# Patient Record
Sex: MALE | Race: WHITE | NOT HISPANIC OR LATINO | Employment: OTHER | URBAN - METROPOLITAN AREA
[De-identification: names, ages, dates, MRNs, and addresses within clinical notes are randomized per-mention and may not be internally consistent; named-entity substitution may affect disease eponyms.]

---

## 2017-01-03 RX ORDER — AMLODIPINE BESYLATE 5 MG/1
TABLET ORAL
Qty: 90 TABLET | Refills: 0 | Status: SHIPPED | OUTPATIENT
Start: 2017-01-03 | End: 2017-03-17 | Stop reason: SDUPTHER

## 2017-01-03 RX ORDER — ATORVASTATIN CALCIUM 40 MG/1
TABLET, FILM COATED ORAL
Qty: 90 TABLET | Refills: 0 | Status: SHIPPED | OUTPATIENT
Start: 2017-01-03 | End: 2017-04-03 | Stop reason: SDUPTHER

## 2017-01-03 RX ORDER — LISINOPRIL 20 MG/1
TABLET ORAL
Qty: 90 TABLET | Refills: 0 | Status: SHIPPED | OUTPATIENT
Start: 2017-01-03 | End: 2017-04-03 | Stop reason: SDUPTHER

## 2017-03-17 RX ORDER — METOPROLOL TARTRATE 50 MG/1
TABLET, FILM COATED ORAL
Qty: 180 TABLET | Refills: 0 | Status: SHIPPED | OUTPATIENT
Start: 2017-03-17 | End: 2017-06-20 | Stop reason: SDUPTHER

## 2017-03-17 RX ORDER — AMLODIPINE BESYLATE 5 MG/1
TABLET ORAL
Qty: 90 TABLET | Refills: 0 | Status: SHIPPED | OUTPATIENT
Start: 2017-03-17 | End: 2017-07-10 | Stop reason: SDUPTHER

## 2017-04-03 RX ORDER — LISINOPRIL 20 MG/1
TABLET ORAL
Qty: 90 TABLET | Refills: 0 | Status: SHIPPED | OUTPATIENT
Start: 2017-04-03 | End: 2017-07-10 | Stop reason: SDUPTHER

## 2017-04-03 RX ORDER — ATORVASTATIN CALCIUM 40 MG/1
TABLET, FILM COATED ORAL
Qty: 90 TABLET | Refills: 0 | Status: SHIPPED | OUTPATIENT
Start: 2017-04-03 | End: 2017-07-10 | Stop reason: SDUPTHER

## 2017-05-19 RX ORDER — CLOPIDOGREL BISULFATE 75 MG/1
TABLET ORAL
Qty: 90 TABLET | Refills: 0 | Status: SHIPPED | OUTPATIENT
Start: 2017-05-19 | End: 2017-08-31 | Stop reason: SDUPTHER

## 2017-06-20 RX ORDER — METOPROLOL TARTRATE 50 MG/1
TABLET, FILM COATED ORAL
Qty: 180 TABLET | Refills: 0 | Status: SHIPPED | OUTPATIENT
Start: 2017-06-20 | End: 2017-09-29 | Stop reason: SDUPTHER

## 2017-06-20 RX ORDER — ISOSORBIDE MONONITRATE 30 MG/1
TABLET, EXTENDED RELEASE ORAL
Qty: 90 TABLET | Refills: 1 | Status: SHIPPED | OUTPATIENT
Start: 2017-06-20 | End: 2018-01-15 | Stop reason: SDUPTHER

## 2017-07-10 RX ORDER — ATORVASTATIN CALCIUM 40 MG/1
TABLET, FILM COATED ORAL
Qty: 90 TABLET | Refills: 0 | Status: SHIPPED | OUTPATIENT
Start: 2017-07-10 | End: 2017-07-11 | Stop reason: SDUPTHER

## 2017-07-10 RX ORDER — AMLODIPINE BESYLATE 5 MG/1
TABLET ORAL
Qty: 90 TABLET | Refills: 0 | Status: SHIPPED | OUTPATIENT
Start: 2017-07-10 | End: 2017-07-11 | Stop reason: SDUPTHER

## 2017-07-10 RX ORDER — LISINOPRIL 20 MG/1
TABLET ORAL
Qty: 90 TABLET | Refills: 0 | Status: SHIPPED | OUTPATIENT
Start: 2017-07-10 | End: 2017-07-11 | Stop reason: SDUPTHER

## 2017-07-11 RX ORDER — AMLODIPINE BESYLATE 5 MG/1
5 TABLET ORAL DAILY
Qty: 90 TABLET | Refills: 1 | Status: SHIPPED | OUTPATIENT
Start: 2017-07-11 | End: 2019-02-20 | Stop reason: SDUPTHER

## 2017-07-11 RX ORDER — ATORVASTATIN CALCIUM 40 MG/1
40 TABLET, FILM COATED ORAL DAILY
Qty: 90 TABLET | Refills: 1 | Status: SHIPPED | OUTPATIENT
Start: 2017-07-11 | End: 2018-04-20 | Stop reason: SDUPTHER

## 2017-07-11 RX ORDER — LISINOPRIL 20 MG/1
20 TABLET ORAL DAILY
Qty: 90 TABLET | Refills: 1 | Status: SHIPPED | OUTPATIENT
Start: 2017-07-11 | End: 2018-04-20 | Stop reason: SDUPTHER

## 2017-08-31 RX ORDER — CLOPIDOGREL BISULFATE 75 MG/1
TABLET ORAL
Qty: 90 TABLET | Refills: 0 | Status: SHIPPED | OUTPATIENT
Start: 2017-08-31 | End: 2018-01-04 | Stop reason: SDUPTHER

## 2017-09-29 RX ORDER — METOPROLOL TARTRATE 50 MG/1
TABLET, FILM COATED ORAL
Qty: 180 TABLET | Refills: 0 | Status: SHIPPED | OUTPATIENT
Start: 2017-09-29 | End: 2018-01-04 | Stop reason: SDUPTHER

## 2018-01-05 RX ORDER — METOPROLOL TARTRATE 50 MG/1
TABLET, FILM COATED ORAL
Qty: 180 TABLET | Refills: 0 | Status: SHIPPED | OUTPATIENT
Start: 2018-01-05 | End: 2018-04-06 | Stop reason: SDUPTHER

## 2018-01-05 RX ORDER — CLOPIDOGREL BISULFATE 75 MG/1
TABLET ORAL
Qty: 90 TABLET | Refills: 0 | Status: SHIPPED | OUTPATIENT
Start: 2018-01-05 | End: 2018-04-06 | Stop reason: SDUPTHER

## 2018-01-15 RX ORDER — ISOSORBIDE MONONITRATE 30 MG/1
TABLET, EXTENDED RELEASE ORAL
Qty: 90 TABLET | Refills: 0 | Status: SHIPPED | OUTPATIENT
Start: 2018-01-15 | End: 2018-04-20 | Stop reason: SDUPTHER

## 2018-04-06 RX ORDER — CLOPIDOGREL BISULFATE 75 MG/1
TABLET ORAL
Qty: 90 TABLET | Refills: 0 | Status: SHIPPED | OUTPATIENT
Start: 2018-04-06 | End: 2018-07-10 | Stop reason: SDUPTHER

## 2018-04-06 RX ORDER — METOPROLOL TARTRATE 50 MG/1
TABLET, FILM COATED ORAL
Qty: 180 TABLET | Refills: 0 | Status: SHIPPED | OUTPATIENT
Start: 2018-04-06 | End: 2018-07-18 | Stop reason: SDUPTHER

## 2018-04-19 RX ORDER — LISINOPRIL 20 MG/1
TABLET ORAL
Qty: 90 TABLET | Refills: 0 | OUTPATIENT
Start: 2018-04-19

## 2018-04-19 RX ORDER — ISOSORBIDE MONONITRATE 30 MG/1
TABLET, EXTENDED RELEASE ORAL
Qty: 90 TABLET | Refills: 0 | OUTPATIENT
Start: 2018-04-19

## 2018-04-19 RX ORDER — ATORVASTATIN CALCIUM 40 MG/1
TABLET, FILM COATED ORAL
Qty: 90 TABLET | Refills: 0 | OUTPATIENT
Start: 2018-04-19

## 2018-04-20 RX ORDER — ISOSORBIDE MONONITRATE 30 MG/1
30 TABLET, EXTENDED RELEASE ORAL DAILY
Qty: 90 TABLET | Refills: 0 | Status: SHIPPED | OUTPATIENT
Start: 2018-04-20 | End: 2018-07-18 | Stop reason: SDUPTHER

## 2018-04-20 RX ORDER — LISINOPRIL 20 MG/1
20 TABLET ORAL DAILY
Qty: 90 TABLET | Refills: 1 | Status: SHIPPED | OUTPATIENT
Start: 2018-04-20 | End: 2019-02-20

## 2018-04-20 RX ORDER — ATORVASTATIN CALCIUM 40 MG/1
40 TABLET, FILM COATED ORAL DAILY
Qty: 90 TABLET | Refills: 1 | Status: SHIPPED | OUTPATIENT
Start: 2018-04-20 | End: 2018-11-26 | Stop reason: SDUPTHER

## 2018-06-20 ENCOUNTER — OFFICE VISIT (OUTPATIENT)
Dept: INTERNAL MEDICINE | Facility: CLINIC | Age: 63
End: 2018-06-20

## 2018-06-20 VITALS
HEART RATE: 70 BPM | RESPIRATION RATE: 16 BRPM | HEIGHT: 72 IN | WEIGHT: 182 LBS | DIASTOLIC BLOOD PRESSURE: 81 MMHG | SYSTOLIC BLOOD PRESSURE: 160 MMHG | TEMPERATURE: 98.4 F | OXYGEN SATURATION: 97 % | BODY MASS INDEX: 24.65 KG/M2

## 2018-06-20 DIAGNOSIS — R09.89 BRUIT OF RIGHT CAROTID ARTERY: ICD-10-CM

## 2018-06-20 DIAGNOSIS — I10 ESSENTIAL HYPERTENSION: ICD-10-CM

## 2018-06-20 DIAGNOSIS — Z00.00 HEALTH CARE MAINTENANCE: Primary | ICD-10-CM

## 2018-06-20 DIAGNOSIS — E78.00 HYPERCHOLESTEROLEMIA: ICD-10-CM

## 2018-06-20 DIAGNOSIS — R01.1 HEART MURMUR: ICD-10-CM

## 2018-06-20 DIAGNOSIS — I25.10 ATHEROSCLEROSIS OF NATIVE CORONARY ARTERY OF NATIVE HEART WITHOUT ANGINA PECTORIS: ICD-10-CM

## 2018-06-20 PROCEDURE — 99214 OFFICE O/P EST MOD 30 MIN: CPT | Performed by: INTERNAL MEDICINE

## 2018-06-20 PROCEDURE — 99396 PREV VISIT EST AGE 40-64: CPT | Performed by: INTERNAL MEDICINE

## 2018-06-29 ENCOUNTER — TELEPHONE (OUTPATIENT)
Dept: INTERNAL MEDICINE | Facility: CLINIC | Age: 63
End: 2018-06-29

## 2018-06-29 DIAGNOSIS — E87.5 HIGH POTASSIUM: Primary | ICD-10-CM

## 2018-06-29 LAB
ALBUMIN SERPL-MCNC: 4.6 G/DL (ref 3.5–5.2)
ALBUMIN/GLOB SERPL: 2.1 G/DL
ALP SERPL-CCNC: 97 U/L (ref 39–117)
ALT SERPL-CCNC: 15 U/L (ref 1–41)
APPEARANCE UR: CLEAR
AST SERPL-CCNC: 16 U/L (ref 1–40)
BACTERIA #/AREA URNS HPF: NORMAL /HPF
BASOPHILS # BLD AUTO: 0.04 10*3/MM3 (ref 0–0.2)
BASOPHILS NFR BLD AUTO: 0.6 % (ref 0–1.5)
BILIRUB SERPL-MCNC: 0.3 MG/DL (ref 0.1–1.2)
BILIRUB UR QL STRIP: NEGATIVE
BUN SERPL-MCNC: 21 MG/DL (ref 8–23)
BUN/CREAT SERPL: 20.4 (ref 7–25)
CALCIUM SERPL-MCNC: 9.7 MG/DL (ref 8.6–10.5)
CASTS URNS MICRO: NORMAL
CHLORIDE SERPL-SCNC: 102 MMOL/L (ref 98–107)
CHOLEST SERPL-MCNC: 167 MG/DL (ref 0–200)
CO2 SERPL-SCNC: 25.2 MMOL/L (ref 22–29)
COLOR UR: YELLOW
CREAT SERPL-MCNC: 1.03 MG/DL (ref 0.76–1.27)
EOSINOPHIL # BLD AUTO: 0.25 10*3/MM3 (ref 0–0.7)
EOSINOPHIL NFR BLD AUTO: 4 % (ref 0.3–6.2)
EPI CELLS #/AREA URNS HPF: NORMAL /HPF
ERYTHROCYTE [DISTWIDTH] IN BLOOD BY AUTOMATED COUNT: 13 % (ref 11.5–14.5)
GLOBULIN SER CALC-MCNC: 2.2 GM/DL
GLUCOSE SERPL-MCNC: 112 MG/DL (ref 65–99)
GLUCOSE UR QL: NEGATIVE
HCT VFR BLD AUTO: 43.2 % (ref 40.4–52.2)
HDLC SERPL-MCNC: 38 MG/DL (ref 40–60)
HGB BLD-MCNC: 13.9 G/DL (ref 13.7–17.6)
HGB UR QL STRIP: NEGATIVE
IMM GRANULOCYTES # BLD: 0.01 10*3/MM3 (ref 0–0.03)
IMM GRANULOCYTES NFR BLD: 0.2 % (ref 0–0.5)
KETONES UR QL STRIP: NEGATIVE
LDLC SERPL CALC-MCNC: 101 MG/DL (ref 0–100)
LDLC/HDLC SERPL: 2.66 {RATIO}
LEUKOCYTE ESTERASE UR QL STRIP: NEGATIVE
LYMPHOCYTES # BLD AUTO: 1.51 10*3/MM3 (ref 0.9–4.8)
LYMPHOCYTES NFR BLD AUTO: 24 % (ref 19.6–45.3)
MCH RBC QN AUTO: 31.1 PG (ref 27–32.7)
MCHC RBC AUTO-ENTMCNC: 32.2 G/DL (ref 32.6–36.4)
MCV RBC AUTO: 96.6 FL (ref 79.8–96.2)
MONOCYTES # BLD AUTO: 0.96 10*3/MM3 (ref 0.2–1.2)
MONOCYTES NFR BLD AUTO: 15.2 % (ref 5–12)
NEUTROPHILS # BLD AUTO: 3.54 10*3/MM3 (ref 1.9–8.1)
NEUTROPHILS NFR BLD AUTO: 56.2 % (ref 42.7–76)
NITRITE UR QL STRIP: NEGATIVE
PH UR STRIP: 5.5 [PH] (ref 5–8)
PLATELET # BLD AUTO: 241 10*3/MM3 (ref 140–500)
POTASSIUM SERPL-SCNC: 6.1 MMOL/L (ref 3.5–5.2)
PROT SERPL-MCNC: 6.8 G/DL (ref 6–8.5)
PROT UR QL STRIP: NEGATIVE
PSA SERPL-MCNC: 0.5 NG/ML (ref 0–4)
RBC # BLD AUTO: 4.47 10*6/MM3 (ref 4.6–6)
RBC #/AREA URNS HPF: NORMAL /HPF
SODIUM SERPL-SCNC: 139 MMOL/L (ref 136–145)
SP GR UR: 1.02 (ref 1–1.03)
T4 FREE SERPL-MCNC: 0.96 NG/DL (ref 0.93–1.7)
TRIGL SERPL-MCNC: 140 MG/DL (ref 0–150)
TSH SERPL DL<=0.005 MIU/L-ACNC: 2.13 MIU/ML (ref 0.27–4.2)
UROBILINOGEN UR STRIP-MCNC: (no result) MG/DL
VLDLC SERPL CALC-MCNC: 28 MG/DL (ref 5–40)
WBC # BLD AUTO: 6.3 10*3/MM3 (ref 4.5–10.7)
WBC #/AREA URNS HPF: NORMAL /HPF

## 2018-06-30 NOTE — PROGRESS NOTES
Subjective   Blaine Jimenez is a 63 y.o. male.   He is here today for complete physical exam and also follow-up for hypertension hyperlipidemia native CAD and heart murmur as well  History of Present Illness   He is here today for complete physical exam and also follow-up for hypertension which is well-controlled current medication and hyperlipidemia which stable on current medication and native CAD which is stable on current medication with no evidence of chest pain or anginal equivalents and heart murmur discovered today as well  The following portions of the patient's history were reviewed and updated as appropriate: allergies, current medications, past family history, past medical history, past social history, past surgical history and problem list.    Review of Systems   All other systems reviewed and are negative.      Objective   Physical Exam   Constitutional: He is oriented to person, place, and time. Vital signs are normal. He appears well-developed and well-nourished. He is active.   HENT:   Head: Normocephalic and atraumatic.   Right Ear: Hearing, tympanic membrane, external ear and ear canal normal.   Left Ear: Hearing, tympanic membrane, external ear and ear canal normal.   Nose: Nose normal.   Mouth/Throat: Uvula is midline, oropharynx is clear and moist and mucous membranes are normal.   Eyes: Conjunctivae, EOM and lids are normal. Pupils are equal, round, and reactive to light. Right eye exhibits no discharge. Left eye exhibits no discharge.   Neck: Trachea normal, normal range of motion, full passive range of motion without pain and phonation normal. Neck supple. Carotid bruit is present (ight side). No edema present. No thyroid mass and no thyromegaly present.   Cardiovascular: Normal rate, regular rhythm, intact distal pulses and normal pulses.  Exam reveals no gallop and no friction rub.    Murmur (3/6 left lower sternal border) heard.  Pulmonary/Chest: Effort normal and breath sounds normal.  No respiratory distress. He has no wheezes. He has no rales.   Abdominal: Soft. Normal appearance, normal aorta and bowel sounds are normal. He exhibits no distension, no abdominal bruit and no mass. There is no hepatosplenomegaly. There is no tenderness. There is no rebound, no guarding and no CVA tenderness. No hernia. Hernia confirmed negative in the right inguinal area and confirmed negative in the left inguinal area.   Musculoskeletal: Normal range of motion. He exhibits no edema or tenderness.     Vascular Status -  His right foot exhibits normal foot vasculature  and no edema. His left foot exhibits normal foot vasculature  and no edema.  Skin Integrity  -  His right foot skin is intact.His left foot skin is intact..  Lymphadenopathy:     He has no cervical adenopathy.     He has no axillary adenopathy.        Right: No inguinal and no supraclavicular adenopathy present.        Left: No inguinal and no supraclavicular adenopathy present.   Neurological: He is alert and oriented to person, place, and time. He has normal strength. No cranial nerve deficit or sensory deficit. He exhibits normal muscle tone. He displays a negative Romberg sign. Coordination normal.   Skin: Skin is warm, dry and intact. No cyanosis. Nails show no clubbing.   Psychiatric: He has a normal mood and affect. His speech is normal and behavior is normal. Judgment and thought content normal. Cognition and memory are normal.   Nursing note and vitals reviewed.      Assessment/Plan   Diagnoses and all orders for this visit:    Health care maintenance  -     Lipid Panel With LDL / HDL Ratio  -     CBC & Differential  -     Comprehensive Metabolic Panel  -     T4, Free  -     TSH  -     Urinalysis With Microscopic - Urine, Clean Catch  -     PSA DIAGNOSTIC  -     Ambulatory Referral For Screening Colonoscopy    Essential hypertension    Hypercholesterolemia    Atherosclerosis of native coronary artery of native heart without angina  pectoris    Heart murmur  -     Adult Transthoracic Echo Complete W/ Cont if Necessary Per Protocol; Future    Bruit of right carotid artery  -     Duplex Carotid Ultrasound CAR; Future    Other orders  -     Microscopic Examination      Healthcare maintenance fasting labs vaccines colonoscopies on a regular basis  Hypertension well-controlled on current medication with no changes needed at this time  Hyper lipidemia stable on current medication with no changes needed and we will check lipid panel today  Native CAD no evidence of chest pain or anginal equivalence continue aspirin and beta blockers and Plavix as well  Heart murmur noted today and we'll get 2-D echo of heart for further workup  Bruit of right carotid artery we will check duplex Dopplers as well

## 2018-07-02 ENCOUNTER — LAB (OUTPATIENT)
Dept: LAB | Facility: HOSPITAL | Age: 63
End: 2018-07-02

## 2018-07-02 DIAGNOSIS — E87.5 HIGH POTASSIUM: ICD-10-CM

## 2018-07-02 LAB
ANION GAP SERPL CALCULATED.3IONS-SCNC: 12.4 MMOL/L
BUN BLD-MCNC: 26 MG/DL (ref 8–23)
BUN/CREAT SERPL: 17.1 (ref 7–25)
CALCIUM SPEC-SCNC: 9.3 MG/DL (ref 8.6–10.5)
CHLORIDE SERPL-SCNC: 104 MMOL/L (ref 98–107)
CO2 SERPL-SCNC: 24.6 MMOL/L (ref 22–29)
CREAT BLD-MCNC: 1.52 MG/DL (ref 0.76–1.27)
GFR SERPL CREATININE-BSD FRML MDRD: 46 ML/MIN/1.73
GLUCOSE BLD-MCNC: 95 MG/DL (ref 65–99)
MAGNESIUM SERPL-MCNC: 2.5 MG/DL (ref 1.6–2.4)
POTASSIUM BLD-SCNC: 5.6 MMOL/L (ref 3.5–5.2)
SODIUM BLD-SCNC: 141 MMOL/L (ref 136–145)

## 2018-07-02 PROCEDURE — 80048 BASIC METABOLIC PNL TOTAL CA: CPT | Performed by: INTERNAL MEDICINE

## 2018-07-02 PROCEDURE — 83735 ASSAY OF MAGNESIUM: CPT

## 2018-07-04 ENCOUNTER — RESULTS ENCOUNTER (OUTPATIENT)
Dept: INTERNAL MEDICINE | Facility: CLINIC | Age: 63
End: 2018-07-04

## 2018-07-04 DIAGNOSIS — E87.5 HIGH POTASSIUM: ICD-10-CM

## 2018-07-05 ENCOUNTER — HOSPITAL ENCOUNTER (OUTPATIENT)
Dept: CARDIOLOGY | Facility: HOSPITAL | Age: 63
Discharge: HOME | End: 2018-07-05

## 2018-07-05 ENCOUNTER — HOSPITAL ENCOUNTER (OUTPATIENT)
Dept: CARDIOLOGY | Facility: HOSPITAL | Age: 63
Discharge: HOME | End: 2018-07-05
Admitting: INTERNAL MEDICINE

## 2018-07-05 VITALS
HEART RATE: 62 BPM | WEIGHT: 182 LBS | BODY MASS INDEX: 24.65 KG/M2 | SYSTOLIC BLOOD PRESSURE: 188 MMHG | HEIGHT: 72 IN | DIASTOLIC BLOOD PRESSURE: 90 MMHG

## 2018-07-05 DIAGNOSIS — R01.1 HEART MURMUR: ICD-10-CM

## 2018-07-05 DIAGNOSIS — R09.89 BRUIT OF RIGHT CAROTID ARTERY: ICD-10-CM

## 2018-07-05 LAB
ASCENDING AORTA: 2.8 CM
BH CV ECHO MEAS - ACS: 1.6 CM
BH CV ECHO MEAS - AO MAX PG (FULL): 7.6 MMHG
BH CV ECHO MEAS - AO MAX PG: 10.4 MMHG
BH CV ECHO MEAS - AO MEAN PG (FULL): 4.3 MMHG
BH CV ECHO MEAS - AO MEAN PG: 6 MMHG
BH CV ECHO MEAS - AO ROOT AREA (BSA CORRECTED): 1.6
BH CV ECHO MEAS - AO ROOT AREA: 8.6 CM^2
BH CV ECHO MEAS - AO ROOT DIAM: 3.3 CM
BH CV ECHO MEAS - AO V2 MAX: 161 CM/SEC
BH CV ECHO MEAS - AO V2 MEAN: 115.8 CM/SEC
BH CV ECHO MEAS - AO V2 VTI: 43.6 CM
BH CV ECHO MEAS - AVA(I,A): 1.6 CM^2
BH CV ECHO MEAS - AVA(I,D): 1.6 CM^2
BH CV ECHO MEAS - AVA(V,A): 1.6 CM^2
BH CV ECHO MEAS - AVA(V,D): 1.6 CM^2
BH CV ECHO MEAS - BSA(HAYCOCK): 2.1 M^2
BH CV ECHO MEAS - BSA: 2 M^2
BH CV ECHO MEAS - BZI_BMI: 24.7 KILOGRAMS/M^2
BH CV ECHO MEAS - BZI_METRIC_HEIGHT: 182.9 CM
BH CV ECHO MEAS - BZI_METRIC_WEIGHT: 82.6 KG
BH CV ECHO MEAS - CONTRAST EF (2CH): 55.9 ML/M^2
BH CV ECHO MEAS - CONTRAST EF 4CH: 55.1 ML/M^2
BH CV ECHO MEAS - EDV(MOD-SP2): 136 ML
BH CV ECHO MEAS - EDV(MOD-SP4): 138 ML
BH CV ECHO MEAS - EDV(TEICH): 220.2 ML
BH CV ECHO MEAS - EF(CUBED): 56.7 %
BH CV ECHO MEAS - EF(MOD-BP): 56 %
BH CV ECHO MEAS - EF(MOD-SP2): 55.9 %
BH CV ECHO MEAS - EF(MOD-SP4): 55.1 %
BH CV ECHO MEAS - EF(TEICH): 47.3 %
BH CV ECHO MEAS - ESV(MOD-SP2): 60 ML
BH CV ECHO MEAS - ESV(MOD-SP4): 62 ML
BH CV ECHO MEAS - ESV(TEICH): 115.9 ML
BH CV ECHO MEAS - FS: 24.4 %
BH CV ECHO MEAS - IVS/LVPW: 0.92
BH CV ECHO MEAS - IVSD: 0.8 CM
BH CV ECHO MEAS - LAT PEAK E' VEL: 10 CM/SEC
BH CV ECHO MEAS - LV DIASTOLIC VOL/BSA (35-75): 67.4 ML/M^2
BH CV ECHO MEAS - LV MASS(C)D: 228.1 GRAMS
BH CV ECHO MEAS - LV MASS(C)DI: 111.5 GRAMS/M^2
BH CV ECHO MEAS - LV MAX PG: 2.8 MMHG
BH CV ECHO MEAS - LV MEAN PG: 1.7 MMHG
BH CV ECHO MEAS - LV SYSTOLIC VOL/BSA (12-30): 30.3 ML/M^2
BH CV ECHO MEAS - LV V1 MAX: 83.4 CM/SEC
BH CV ECHO MEAS - LV V1 MEAN: 62 CM/SEC
BH CV ECHO MEAS - LV V1 VTI: 22.7 CM
BH CV ECHO MEAS - LVIDD: 6.6 CM
BH CV ECHO MEAS - LVIDS: 5 CM
BH CV ECHO MEAS - LVLD AP2: 8.6 CM
BH CV ECHO MEAS - LVLD AP4: 8.6 CM
BH CV ECHO MEAS - LVLS AP2: 7.3 CM
BH CV ECHO MEAS - LVLS AP4: 7.8 CM
BH CV ECHO MEAS - LVOT AREA (M): 3.1 CM^2
BH CV ECHO MEAS - LVOT AREA: 3.1 CM^2
BH CV ECHO MEAS - LVOT DIAM: 2 CM
BH CV ECHO MEAS - LVPWD: 0.87 CM
BH CV ECHO MEAS - MED PEAK E' VEL: 8 CM/SEC
BH CV ECHO MEAS - MR MAX PG: 101.5 MMHG
BH CV ECHO MEAS - MR MAX VEL: 503.7 CM/SEC
BH CV ECHO MEAS - MV A DUR: 0.11 SEC
BH CV ECHO MEAS - MV A MAX VEL: 90.7 CM/SEC
BH CV ECHO MEAS - MV DEC SLOPE: 854.5 CM/SEC^2
BH CV ECHO MEAS - MV DEC TIME: 0.13 SEC
BH CV ECHO MEAS - MV E MAX VEL: 107.7 CM/SEC
BH CV ECHO MEAS - MV E/A: 1.2
BH CV ECHO MEAS - MV MAX PG: 7.7 MMHG
BH CV ECHO MEAS - MV MEAN PG: 3.1 MMHG
BH CV ECHO MEAS - MV P1/2T MAX VEL: 105.7 CM/SEC
BH CV ECHO MEAS - MV P1/2T: 36.2 MSEC
BH CV ECHO MEAS - MV V2 MAX: 138.5 CM/SEC
BH CV ECHO MEAS - MV V2 MEAN: 81.8 CM/SEC
BH CV ECHO MEAS - MV V2 VTI: 39.7 CM
BH CV ECHO MEAS - MVA P1/2T LCG: 2.1 CM^2
BH CV ECHO MEAS - MVA(P1/2T): 6.1 CM^2
BH CV ECHO MEAS - MVA(VTI): 1.8 CM^2
BH CV ECHO MEAS - PA ACC TIME: 0.14 SEC
BH CV ECHO MEAS - PA MAX PG (FULL): 5.9 MMHG
BH CV ECHO MEAS - PA MAX PG: 7.7 MMHG
BH CV ECHO MEAS - PA PR(ACCEL): 14 MMHG
BH CV ECHO MEAS - PA V2 MAX: 138.7 CM/SEC
BH CV ECHO MEAS - PULM A REVS DUR: 0.12 SEC
BH CV ECHO MEAS - PULM A REVS VEL: 29.2 CM/SEC
BH CV ECHO MEAS - PULM DIAS VEL: 86.5 CM/SEC
BH CV ECHO MEAS - PULM S/D: 0.79
BH CV ECHO MEAS - PULM SYS VEL: 68.7 CM/SEC
BH CV ECHO MEAS - RAP SYSTOLE: 3 MMHG
BH CV ECHO MEAS - RV MAX PG: 1.8 MMHG
BH CV ECHO MEAS - RV MEAN PG: 0.99 MMHG
BH CV ECHO MEAS - RV V1 MAX: 66.9 CM/SEC
BH CV ECHO MEAS - RV V1 MEAN: 45.5 CM/SEC
BH CV ECHO MEAS - RV V1 VTI: 14.1 CM
BH CV ECHO MEAS - RVSP: 43 MMHG
BH CV ECHO MEAS - SI(AO): 183 ML/M^2
BH CV ECHO MEAS - SI(CUBED): 78.1 ML/M^2
BH CV ECHO MEAS - SI(LVOT): 34.8 ML/M^2
BH CV ECHO MEAS - SI(MOD-SP2): 37.1 ML/M^2
BH CV ECHO MEAS - SI(MOD-SP4): 37.1 ML/M^2
BH CV ECHO MEAS - SI(TEICH): 50.9 ML/M^2
BH CV ECHO MEAS - SUP REN AO DIAM: 2.3 CM
BH CV ECHO MEAS - SV(AO): 374.6 ML
BH CV ECHO MEAS - SV(CUBED): 159.8 ML
BH CV ECHO MEAS - SV(LVOT): 71.3 ML
BH CV ECHO MEAS - SV(MOD-SP2): 76 ML
BH CV ECHO MEAS - SV(MOD-SP4): 76 ML
BH CV ECHO MEAS - SV(TEICH): 104.2 ML
BH CV ECHO MEAS - TAPSE (>1.6): 2.9 CM2
BH CV ECHO MEAS - TR MAX VEL: 316.3 CM/SEC
BH CV ECHO MEASUREMENTS AVERAGE E/E' RATIO: 11.97
BH CV XLRA - RV BASE: 3.2 CM
BH CV XLRA - TDI S': 14 CM/SEC
BH CV XLRA MEAS LEFT DIST CCA EDV: 9.4 CM/SEC
BH CV XLRA MEAS LEFT DIST CCA PSV: 70.4 CM/SEC
BH CV XLRA MEAS LEFT DIST ICA EDV: 0 CM/SEC
BH CV XLRA MEAS LEFT DIST ICA PSV: 0 CM/SEC
BH CV XLRA MEAS LEFT MID ICA EDV: 0 CM/SEC
BH CV XLRA MEAS LEFT MID ICA PSV: 0 CM/SEC
BH CV XLRA MEAS LEFT PROX CCA PSV: 63.3 CM/SEC
BH CV XLRA MEAS LEFT PROX ECA EDV: -5.9 CM/SEC
BH CV XLRA MEAS LEFT PROX ECA PSV: -100 CM/SEC
BH CV XLRA MEAS LEFT PROX ICA EDV: 0 CM/SEC
BH CV XLRA MEAS LEFT PROX ICA PSV: 0 CM/SEC
BH CV XLRA MEAS LEFT PROX SCLA PSV: 141 CM/SEC
BH CV XLRA MEAS LEFT VERTEBRAL A EDV: 6.5 CM/SEC
BH CV XLRA MEAS LEFT VERTEBRAL A PSV: 28.1 CM/SEC
BH CV XLRA MEAS RIGHT DIST CCA EDV: 21.7 CM/SEC
BH CV XLRA MEAS RIGHT DIST CCA PSV: 84.4 CM/SEC
BH CV XLRA MEAS RIGHT DIST ICA EDV: -29.9 CM/SEC
BH CV XLRA MEAS RIGHT DIST ICA PSV: -82.1 CM/SEC
BH CV XLRA MEAS RIGHT MID ICA EDV: -36.9 CM/SEC
BH CV XLRA MEAS RIGHT MID ICA PSV: -97.3 CM/SEC
BH CV XLRA MEAS RIGHT PROX CCA EDV: 17 CM/SEC
BH CV XLRA MEAS RIGHT PROX CCA PSV: 82.7 CM/SEC
BH CV XLRA MEAS RIGHT PROX ECA PSV: -90.9 CM/SEC
BH CV XLRA MEAS RIGHT PROX ICA EDV: -44 CM/SEC
BH CV XLRA MEAS RIGHT PROX ICA PSV: -134 CM/SEC
BH CV XLRA MEAS RIGHT PROX SCLA PSV: 112 CM/SEC
BH CV XLRA MEAS RIGHT VERTEBRAL A EDV: -24 CM/SEC
BH CV XLRA MEAS RIGHT VERTEBRAL A PSV: -79.2 CM/SEC
LEFT ARM BP: NORMAL MMHG
LEFT ATRIUM VOLUME INDEX: 28 ML/M2
MAXIMAL PREDICTED HEART RATE: 156 BPM
RIGHT ARM BP: NORMAL MMHG
SINUS: 2.9 CM
STJ: 1.8 CM
STRESS TARGET HR: 133 BPM

## 2018-07-05 PROCEDURE — 93306 TTE W/DOPPLER COMPLETE: CPT

## 2018-07-05 PROCEDURE — 93306 TTE W/DOPPLER COMPLETE: CPT | Performed by: INTERNAL MEDICINE

## 2018-07-05 PROCEDURE — 93880 EXTRACRANIAL BILAT STUDY: CPT

## 2018-07-10 DIAGNOSIS — R93.1 ABNORMAL ECHOCARDIOGRAM: Primary | ICD-10-CM

## 2018-07-10 DIAGNOSIS — N28.9 RENAL INSUFFICIENCY: Primary | ICD-10-CM

## 2018-07-10 DIAGNOSIS — R94.39 ABNORMAL CAROTID DUPLEX SCAN: ICD-10-CM

## 2018-07-10 RX ORDER — AMLODIPINE BESYLATE 5 MG/1
TABLET ORAL
Qty: 90 TABLET | Refills: 0 | Status: SHIPPED | OUTPATIENT
Start: 2018-07-10 | End: 2018-10-22 | Stop reason: SDUPTHER

## 2018-07-10 RX ORDER — CLOPIDOGREL BISULFATE 75 MG/1
TABLET ORAL
Qty: 90 TABLET | Refills: 0 | Status: SHIPPED | OUTPATIENT
Start: 2018-07-10 | End: 2018-10-22 | Stop reason: SDUPTHER

## 2018-07-15 ENCOUNTER — RESULTS ENCOUNTER (OUTPATIENT)
Dept: INTERNAL MEDICINE | Facility: CLINIC | Age: 63
End: 2018-07-15

## 2018-07-15 DIAGNOSIS — N28.9 RENAL INSUFFICIENCY: ICD-10-CM

## 2018-07-18 RX ORDER — ISOSORBIDE MONONITRATE 30 MG/1
TABLET, EXTENDED RELEASE ORAL
Qty: 90 TABLET | Refills: 0 | Status: SHIPPED | OUTPATIENT
Start: 2018-07-18 | End: 2018-10-31 | Stop reason: SDUPTHER

## 2018-07-18 RX ORDER — METOPROLOL TARTRATE 50 MG/1
TABLET, FILM COATED ORAL
Qty: 180 TABLET | Refills: 0 | Status: SHIPPED | OUTPATIENT
Start: 2018-07-18 | End: 2018-10-31 | Stop reason: SDUPTHER

## 2018-07-19 ENCOUNTER — TRANSCRIBE ORDERS (OUTPATIENT)
Dept: ADMINISTRATIVE | Facility: HOSPITAL | Age: 63
End: 2018-07-19

## 2018-07-19 DIAGNOSIS — I65.23 CAROTID STENOSIS, BILATERAL: Primary | ICD-10-CM

## 2018-07-23 ENCOUNTER — HOSPITAL ENCOUNTER (OUTPATIENT)
Dept: CT IMAGING | Facility: HOSPITAL | Age: 63
Discharge: HOME | End: 2018-07-23
Attending: SURGERY | Admitting: SURGERY

## 2018-07-23 DIAGNOSIS — I65.23 CAROTID STENOSIS, BILATERAL: ICD-10-CM

## 2018-07-23 PROCEDURE — 25010000002 IOPAMIDOL 61 % SOLUTION: Performed by: SURGERY

## 2018-07-23 PROCEDURE — 82565 ASSAY OF CREATININE: CPT

## 2018-07-23 PROCEDURE — 70498 CT ANGIOGRAPHY NECK: CPT

## 2018-07-23 PROCEDURE — 70496 CT ANGIOGRAPHY HEAD: CPT

## 2018-07-23 RX ADMIN — IOPAMIDOL 95 ML: 612 INJECTION, SOLUTION INTRAVENOUS at 18:45

## 2018-07-24 LAB — CREAT BLDA-MCNC: 1.4 MG/DL (ref 0.6–1.3)

## 2018-08-08 ENCOUNTER — HOSPITAL ENCOUNTER (EMERGENCY)
Facility: HOSPITAL | Age: 63
Discharge: HOME | End: 2018-08-08
Attending: EMERGENCY MEDICINE | Admitting: EMERGENCY MEDICINE

## 2018-08-08 VITALS
SYSTOLIC BLOOD PRESSURE: 179 MMHG | WEIGHT: 180 LBS | OXYGEN SATURATION: 98 % | HEIGHT: 72 IN | TEMPERATURE: 98.1 F | DIASTOLIC BLOOD PRESSURE: 99 MMHG | HEART RATE: 72 BPM | RESPIRATION RATE: 18 BRPM | BODY MASS INDEX: 24.38 KG/M2

## 2018-08-08 DIAGNOSIS — S01.01XA LACERATION OF SCALP, INITIAL ENCOUNTER: Primary | ICD-10-CM

## 2018-08-08 PROCEDURE — 99283 EMERGENCY DEPT VISIT LOW MDM: CPT

## 2018-08-08 PROCEDURE — 90715 TDAP VACCINE 7 YRS/> IM: CPT | Performed by: PHYSICIAN ASSISTANT

## 2018-08-08 PROCEDURE — 90471 IMMUNIZATION ADMIN: CPT | Performed by: PHYSICIAN ASSISTANT

## 2018-08-08 PROCEDURE — 25010000002 TDAP 5-2.5-18.5 LF-MCG/0.5 SUSPENSION: Performed by: PHYSICIAN ASSISTANT

## 2018-08-08 RX ORDER — LIDOCAINE HYDROCHLORIDE 10 MG/ML
10 INJECTION, SOLUTION INFILTRATION; PERINEURAL ONCE
Status: DISCONTINUED | OUTPATIENT
Start: 2018-08-08 | End: 2018-08-08

## 2018-08-08 RX ORDER — LIDOCAINE HYDROCHLORIDE AND EPINEPHRINE 10; 10 MG/ML; UG/ML
10 INJECTION, SOLUTION INFILTRATION; PERINEURAL ONCE
Status: COMPLETED | OUTPATIENT
Start: 2018-08-08 | End: 2018-08-08

## 2018-08-08 RX ORDER — CEPHALEXIN 500 MG/1
500 CAPSULE ORAL 3 TIMES DAILY
Qty: 21 CAPSULE | Refills: 0 | Status: SHIPPED | OUTPATIENT
Start: 2018-08-08 | End: 2019-02-20

## 2018-08-08 RX ADMIN — TETANUS TOXOID, REDUCED DIPHTHERIA TOXOID AND ACELLULAR PERTUSSIS VACCINE, ADSORBED 0.5 ML: 5; 2.5; 8; 8; 2.5 SUSPENSION INTRAMUSCULAR at 16:10

## 2018-08-08 RX ADMIN — LIDOCAINE HYDROCHLORIDE,EPINEPHRINE BITARTRATE 10 ML: 10; .01 INJECTION, SOLUTION INFILTRATION; PERINEURAL at 16:09

## 2018-08-08 NOTE — ED PROVIDER NOTES
EMERGENCY DEPARTMENT ENCOUNTER    CHIEF COMPLAINT  Chief Complaint: Head Injury  History given by: Patient  History limited by: none  Room Number: 42/42  PMD: Naveen Singh MD      HPI:  Pt is a 63 y.o. male who presents with complaint of head injury that occurred PTA at ED while standing up and hitting top of head on steel and glass window pain. Pt denies LOC. Pt confirms anticoagulation.     Location of Head Injury- parietal  Anticoagulants - Yes  Associated Symptoms - none  Aggravating Factors - none   History of Bleeding Disorder - No  LOC - No  Greater than Age 65: No      PAST MEDICAL HISTORY  Active Ambulatory Problems     Diagnosis Date Noted   • Spasm of back muscles 06/27/2016   • Atherosclerosis of coronary artery 06/27/2016   • Generalized anxiety disorder 06/27/2016   • Hypercholesterolemia 06/27/2016   • Hypertension 06/27/2016   • Controlled substance agreement signed 06/27/2016   • Health care maintenance 06/20/2018   • Bruit of right carotid artery 06/20/2018   • Heart murmur 06/20/2018     Resolved Ambulatory Problems     Diagnosis Date Noted   • Acute myocardial infarction 06/27/2016     Past Medical History:   Diagnosis Date   • Hyperlipidemia    • Hypertension        PAST SURGICAL HISTORY  Past Surgical History:   Procedure Laterality Date   • CARDIAC CATHETERIZATION         FAMILY HISTORY  Family History   Problem Relation Age of Onset   • Heart disease Father    • Heart disease Brother        SOCIAL HISTORY  Social History     Social History   • Marital status: Single     Spouse name: N/A   • Number of children: N/A   • Years of education: N/A     Occupational History   • [employer]      Social History Main Topics   • Smoking status: Never Smoker   • Smokeless tobacco: Not on file   • Alcohol use No   • Drug use: Unknown   • Sexual activity: Defer     Other Topics Concern   • Not on file     Social History Narrative   • No narrative on file       ALLERGIES  Patient has no known  allergies.    REVIEW OF SYSTEMS  Review of Systems   Constitutional: Negative for chills and diaphoresis.   Cardiovascular: Positive for leg swelling. Negative for chest pain.   Skin: Positive for wound (laceration to top of head).   Neurological: Negative for light-headedness and headaches.   All other systems reviewed and are negative.      PHYSICAL EXAM  ED Triage Vitals   Temp Heart Rate Resp BP SpO2   08/08/18 1336 08/08/18 1336 08/08/18 1336 08/08/18 1348 08/08/18 1336   98.1 °F (36.7 °C) 79 16 (!) 185/90 98 %      Temp src Heart Rate Source Patient Position BP Location FiO2 (%)   -- -- 08/08/18 1348 -- --     Lying         Physical Exam   Constitutional: No distress.   HENT:   Head: Normocephalic. Head is with laceration.   V-shaped 5 cm skin avulsion to top parietal scalp, nontender   Eyes: EOM are normal.   Neck: Normal range of motion. No spinous process tenderness and no muscular tenderness present.   Pulmonary/Chest: No respiratory distress.   Abdominal: There is no tenderness.   Musculoskeletal: He exhibits no edema.   Neurological: He is alert.   Skin: Skin is warm and dry.   Nursing note and vitals reviewed.    Procedures      PROGRESS AND CONSULTS     1440: Discussed pt's case with Usama Guidry PA-C who agreed to repair laceration prior to disposition. See note for laceration repair.     1520: Laceration repair successful. Pt will be discharged with abx for follow up and suture removal. Pt understands and agrees with plan, all questions addressed.       MEDICAL DECISION MAKING  Results were reviewed/discussed with the patient and they were also made aware of online access. Pt also made aware that some labs, such as cultures, will not be resulted during ER visit and follow up with PMD is necessary.     MDM         DIAGNOSIS  Final diagnoses:   Laceration of scalp, initial encounter       DISPOSITION  DISCHARGE    Patient discharged in stable condition.    Reviewed implications of results, diagnosis,  meds, responsibility to follow up, warning signs and symptoms of possible worsening, potential complications and reasons to return to ER.    Patient/Family voiced understanding of above instructions.    Discussed plan for discharge, as there is no emergent indication for admission. Patient referred to primary care provider for BP management due to today's BP. Pt/family is agreeable and understands need for follow up and repeat testing.  Pt is aware that discharge does not mean that nothing is wrong but it indicates no emergency is present that requires admission and they must continue care with follow-up as given below or physician of their choice.     FOLLOW-UP  Naveen Singh MD  [address] [address]  [phone]               Medication List      New Prescriptions    cephalexin 500 MG capsule  Commonly known as:  KEFLEX  Take 1 capsule by mouth 3 (Three) Times a Day.              Latest Documented Vital Signs:  As of 3:33 PM  BP- 175/97 HR- 79 Temp- 98.1 °F (36.7 °C) O2 sat- 98%    --  Documentation assistance provided by alicia Herron for Dr. Cheung.  Information recorded by the solitarioibe was done at my direction and has been verified and validated by me.              Adina Herron  08/08/18 [id number]       Norman Cheung MD  08/08/18 2646

## 2018-08-08 NOTE — ED PROVIDER NOTES
Laceration Repair  Date/Time: 8/8/2018 3:09 PM  Performed by: CHANA GUIDRY  Authorized by: HAYLEE NOBLE     Consent:     Consent obtained:  Verbal    Consent given by:  Patient    Risks discussed:  Infection, need for additional repair, nerve damage, pain, poor cosmetic result, poor wound healing, retained foreign body, tendon damage and vascular damage  Anesthesia (see MAR for exact dosages):     Anesthesia method:  Local infiltration    Local anesthetic:  Lidocaine 1% WITH epi  Laceration details:     Location:  Scalp    Scalp location: vertex.    Length (cm):  6.9  Repair type:     Repair type:  Intermediate  Pre-procedure details:     Preparation:  Patient was prepped and draped in usual sterile fashion  Exploration:     Hemostasis achieved with:  Direct pressure    Wound exploration: wound explored through full range of motion and entire depth of wound probed and visualized      Wound extent: no fascia violation noted, no foreign bodies/material noted, no muscle damage noted, no nerve damage noted, no tendon damage noted, no underlying fracture noted and no vascular damage noted      Contaminated: no    Treatment:     Area cleansed with:  Hibiclens and saline    Amount of cleaning:  Extensive    Irrigation solution:  Sterile saline    Irrigation method:  Pressure wash  Skin repair:     Repair method:  Sutures    Suture size:  5-0    Suture material:  Nylon    Suture technique:  Running    Number of sutures:  15  Approximation:     Approximation:  Close  Post-procedure details:     Dressing:  Antibiotic ointment    Patient tolerance of procedure:  Tolerated well, no immediate complications                        Documentation assistance provided by Jesi Chinchilla. Information recorded by the scribe was done at my direction and has been verified and validated by me.     Entered by Jesi Chinchilla, acting as scribe for SHANICE Pabon Shatila  08/08/18 1531       Chana Guidry PA  08/08/18  [id number]

## 2018-08-09 ENCOUNTER — TELEPHONE (OUTPATIENT)
Dept: SOCIAL WORK | Facility: HOSPITAL | Age: 63
End: 2018-08-09

## 2018-09-26 ENCOUNTER — OFFICE VISIT (OUTPATIENT)
Dept: CARDIOLOGY | Facility: CLINIC | Age: 63
End: 2018-09-26

## 2018-09-26 VITALS
SYSTOLIC BLOOD PRESSURE: 190 MMHG | HEIGHT: 72 IN | WEIGHT: 181 LBS | HEART RATE: 58 BPM | BODY MASS INDEX: 24.52 KG/M2 | DIASTOLIC BLOOD PRESSURE: 102 MMHG

## 2018-09-26 DIAGNOSIS — I25.10 ATHEROSCLEROSIS OF NATIVE CORONARY ARTERY OF NATIVE HEART WITHOUT ANGINA PECTORIS: Primary | ICD-10-CM

## 2018-09-26 DIAGNOSIS — E78.00 HYPERCHOLESTEROLEMIA: ICD-10-CM

## 2018-09-26 DIAGNOSIS — I10 ESSENTIAL HYPERTENSION: ICD-10-CM

## 2018-09-26 PROCEDURE — 99204 OFFICE O/P NEW MOD 45 MIN: CPT | Performed by: INTERNAL MEDICINE

## 2018-09-26 PROCEDURE — 93000 ELECTROCARDIOGRAM COMPLETE: CPT | Performed by: INTERNAL MEDICINE

## 2018-09-26 NOTE — PROGRESS NOTES
Subjective:     Encounter Date:09/26/2018      Patient ID: Blaine Jimenez is a 63 y.o. male.    Chief Complaint:  History of Present Illness    Is a 63-year-old with a history of coronary artery disease, hypertension, hyperlipidemia, peripheral vascular disease, who presents for an evaluation of an abnormal echocardiogram.    Patient was seen recently for routine follow-up with Dr. Brown.  During that evaluation the patient was noted to have a murmur on exam.  He is set up for an echocardiogram for this reason which showed normal left ventricular systolic function with an EF of 56%, hypokinesis of his basal inferior, inferolateral, mid inferior, and mid inferolateral walls, normal diastolic function, and mild mitral regurgitation.  The patient denies any symptoms of chest pain, shortness of breath, PND orthopnea, near-syncope or syncope, palpitations, or lower extremity edema.  He admits that every time he has his blood pressure checked and a doctor's office it elevated.  He does not check his blood pressures at home.  He reports that it's been a struggle to get his blood pressures under control.  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  Addition to the echocardiogram he also had a carotid ultrasound which showed moderate stenosis of his proximal right internal carotid artery and occlusion of a proximal left internal carotid artery.    His prior cardiac history includes the presentation with non-ST elevation MI and 10/2013.  At the time he presented with complaints of severe heartburn type symptoms.  On arrival he was noted to be severely hypertensive.  His troponins ended up being abnormal consistent with a non-ST elevation MI.  He underwent a cardiac catheterization on 10/9/2013 with Dr. Harper which showed normal left main, nonobstructive disease of his LAD, severe ostial disease of  a small first diagonal branch, 90% ostial stenosis of a large ramus intermedius artery, moderate nonobstructive disease of the left circumflex artery, and severe distal stenosis of the right coronary artery with well-formed left to right collateral filling of the distal vessel.  His left ventricular angiogram showed akinesis of his basal to mid inferior wall.  Based on the presence of primarily branch vessel disease and is distal when he artery stenosis with left-to-right collateral filling medical management was recommended at that time.  It does not appear that he is really followed up with any cardiologist since then.    Review of Systems   Constitution: Negative for weakness and malaise/fatigue.   HENT: Negative for hearing loss, hoarse voice, nosebleeds and sore throat.    Eyes: Negative for pain.   Cardiovascular: Negative for chest pain, claudication, cyanosis, dyspnea on exertion, irregular heartbeat, leg swelling, near-syncope, orthopnea, palpitations, paroxysmal nocturnal dyspnea and syncope.   Respiratory: Negative for shortness of breath and snoring.    Endocrine: Negative for cold intolerance, heat intolerance, polydipsia, polyphagia and polyuria.   Skin: Negative for itching and rash.   Musculoskeletal: Negative for arthritis, falls, joint pain, joint swelling, muscle cramps, muscle weakness and myalgias.   Gastrointestinal: Negative for constipation, diarrhea, dysphagia, heartburn, hematemesis, hematochezia, melena, nausea and vomiting.   Genitourinary: Negative for frequency, hematuria and hesitancy.   Neurological: Negative for excessive daytime sleepiness, dizziness, headaches, light-headedness and numbness.   Psychiatric/Behavioral: Negative for depression. The patient is not nervous/anxious.           Current Outpatient Prescriptions:   •  amLODIPine (NORVASC) 5 MG tablet, Take 1 tablet by mouth Daily., Disp: 90 tablet, Rfl: 1  •  amLODIPine (NORVASC) 5 MG tablet, TAKE ONE TABLET BY MOUTH DAILY,  "Disp: 90 tablet, Rfl: 0  •  aspirin 81 MG chewable tablet, Chew., Disp: , Rfl:   •  atorvastatin (LIPITOR) 40 MG tablet, Take 1 tablet by mouth Daily., Disp: 90 tablet, Rfl: 1  •  cephalexin (KEFLEX) 500 MG capsule, Take 1 capsule by mouth 3 (Three) Times a Day., Disp: 21 capsule, Rfl: 0  •  clopidogrel (PLAVIX) 75 MG tablet, TAKE ONE TABLET BY MOUTH DAILY, Disp: 90 tablet, Rfl: 0  •  isosorbide mononitrate (IMDUR) 30 MG 24 hr tablet, TAKE ONE TABLET BY MOUTH DAILY, Disp: 90 tablet, Rfl: 0  •  metoprolol tartrate (LOPRESSOR) 50 MG tablet, TAKE ONE TABLET BY MOUTH TWICE A DAY, Disp: 180 tablet, Rfl: 0  •  lisinopril (PRINIVIL,ZESTRIL) 20 MG tablet, Take 1 tablet by mouth Daily., Disp: 90 tablet, Rfl: 1  •  nitroglycerin (NITROLINGUAL) 0.4 MG/SPRAY spray, Nitroglycerin 0.4 MG/SPRAY Translingual Solution; Patient Sig: Nitroglycerin 0.4 MG/SPRAY Translingual Solution ; 0; 16-Oct-2013; Active, Disp: , Rfl:     Past Medical History:   Diagnosis Date   • Atherosclerosis of native coronary artery of native heart without angina pectoris    • Bruit of right carotid artery    • Heart murmur    • Hyperlipidemia    • Hypertension      Past Surgical History:   Procedure Laterality Date   • CARDIAC CATHETERIZATION  10/09/2013    [facility], Dr. David Harper     Family History   Problem Relation Age of Onset   • Heart disease Father    • Heart disease Brother      Social History   Substance Use Topics   • Smoking status: Never Smoker   • Smokeless tobacco: Not on file   • Alcohol use No           ECG 12 Lead  Date/Time: 9/26/2018 11:51 AM  Performed by: EUSEBIO CIFUENTES  Authorized by: EUSEBIO CIFUENTES   Comparison: compared with previous ECG   Similar to previous ECG  Rhythm: sinus rhythm               Objective:         Visit Vitals  BP (!) 190/102 (BP Location: Right arm, Patient Position: Sitting) Comment:  92   Pulse 58   Ht 182.9 cm (72\")   Wt 82.1 kg (181 lb)   BMI 24.55 kg/m²          Physical Exam "   Constitutional: He is oriented to person, place, and time. He appears well-developed and well-nourished.   HENT:   Head: Normocephalic and atraumatic.   Neck: No JVD present. Carotid bruit is not present.   Cardiovascular: Normal rate, regular rhythm, S1 normal and S2 normal.  Exam reveals no gallop.    No murmur heard.  Pulses:       Radial pulses are 2+ on the right side, and 2+ on the left side.   No bilateral lower extremity edema   Pulmonary/Chest: Effort normal and breath sounds normal.   Abdominal: Soft. Normal appearance.   Neurological: He is alert and oriented to person, place, and time.   Skin: Skin is warm, dry and intact.   Psychiatric: He has a normal mood and affect.       Lab Review:       Assessment:          Diagnosis Plan   1. Atherosclerosis of native coronary artery of native heart without angina pectoris     2. Essential hypertension     3. Hypercholesterolemia            Plan:       1.  Abnormal echocardiogram.  This showed wall motion abnormalities primarily involving the inferior wall.  This appears to be chronic since it was present during his cardiac catheterization back in 10/2013 and is likely related to his known right coronary artery disease.  Patient is currently asymptomatic.  His overall left ventricular systolic function is preserved.  At this point I would not pursue any further workup as long as he remains asymptomatic and his left intercostal systolic function remains normal.  2.  Hypertension.  Poorly controlled based on his readings today.  However is unclear if he normally runs as high at home.  I asked him to purchase a blood pressure cuff and start taking his blood pressures on a daily basis.  He will need to call me in a week with his blood pressure readings.  Based on that we'll titrate his medications.  3.  Coronary artery disease.  Known primarily branch vessel disease which is not easily amenable to PCI and a chronic appearing distal right coronary artery stenosis  with left to right collaterals.  He is asymptomatic at this time.  Will continue medical management.  4.  Peripheral vascular disease  5.  Hyperlipidemia.  Most recent lipid panel showed his cholesterol was not quite at goal with an LDL of 101.  I would like to get his LDL closer to 70.  She consider increasing his atorvastatin dosage.    Will plan on seeing the patient back again in one month.    Coronary Artery Disease  Assessment  • The patient has no angina    Plan  • Lifestyle modifications discussed include adhering to a heart healthy diet, avoidance of tobacco products, maintenance of a healthy weight, medication compliance, regular exercise and regular monitoring of cholesterol and blood pressure    Subjective - Objective  • Current antiplatelet therapy includes aspirin 81 mg

## 2018-10-22 RX ORDER — AMLODIPINE BESYLATE 5 MG/1
TABLET ORAL
Qty: 30 TABLET | Refills: 0 | Status: SHIPPED | OUTPATIENT
Start: 2018-10-22 | End: 2018-11-26 | Stop reason: SDUPTHER

## 2018-10-22 RX ORDER — CLOPIDOGREL BISULFATE 75 MG/1
TABLET ORAL
Qty: 30 TABLET | Refills: 0 | Status: SHIPPED | OUTPATIENT
Start: 2018-10-22 | End: 2018-11-26 | Stop reason: SDUPTHER

## 2018-10-30 ENCOUNTER — OFFICE VISIT (OUTPATIENT)
Dept: CARDIOLOGY | Facility: CLINIC | Age: 63
End: 2018-10-30

## 2018-10-30 VITALS
WEIGHT: 183 LBS | HEART RATE: 58 BPM | SYSTOLIC BLOOD PRESSURE: 148 MMHG | DIASTOLIC BLOOD PRESSURE: 82 MMHG | HEIGHT: 72 IN | BODY MASS INDEX: 24.79 KG/M2

## 2018-10-30 DIAGNOSIS — I10 ESSENTIAL HYPERTENSION: ICD-10-CM

## 2018-10-30 DIAGNOSIS — E78.00 HYPERCHOLESTEROLEMIA: ICD-10-CM

## 2018-10-30 DIAGNOSIS — I25.10 ATHEROSCLEROSIS OF NATIVE CORONARY ARTERY OF NATIVE HEART WITHOUT ANGINA PECTORIS: Primary | ICD-10-CM

## 2018-10-30 PROCEDURE — 93000 ELECTROCARDIOGRAM COMPLETE: CPT | Performed by: INTERNAL MEDICINE

## 2018-10-30 PROCEDURE — 99213 OFFICE O/P EST LOW 20 MIN: CPT | Performed by: INTERNAL MEDICINE

## 2018-10-30 NOTE — PROGRESS NOTES
Subjective:     Encounter Date:10/30/2018      Patient ID: Blaine Jimenez is a 63 y.o. male.    Chief Complaint:  History of Present Illness    This is a 63-year-old with a history of coronary artery disease, hypertension, hyperlipidemia, peripheral vascular disease, who presents for follow up.       I saw the patient initially in 9/2018 when he presented for evaluation of an abnormal echocardiogram.  The patient had been seen for routine follow up by Dr. Singh at which time he was noted to have a murmur on exam.  He is set up for an echocardiogram for this reason which showed normal left ventricular systolic function with an EF of 56%, hypokinesis of his basal inferior, inferolateral, mid inferior, and mid inferolateral walls, normal diastolic function, and mild mitral regurgitation.  The patient denied any symptoms at the time.  His blood pressures were noted to be elevated.  Additionally he had a carotid ultrasound which showed moderate stenosis of his proximal right internal carotid artery and occlusion of a proximal left internal carotid artery.     His prior cardiac history includes the presentation with non-ST elevation MI and 10/2013.  At the time he presented with complaints of severe heartburn type symptoms.  On arrival he was noted to be severely hypertensive.  His troponins ended up being abnormal consistent with a non-ST elevation MI.  He underwent a cardiac catheterization on 10/9/2013 with Dr. Harper which showed normal left main, nonobstructive disease of his LAD, severe ostial disease of a small first diagonal branch, 90% ostial stenosis of a large ramus intermedius artery, moderate nonobstructive disease of the left circumflex artery, and severe distal stenosis of the right coronary artery with well-formed left to right collateral filling of the distal vessel.  His left ventricular angiogram showed akinesis of his basal to mid inferior wall.  Based on the presence of primarily branch  and distal vessel disease and a chronic total occlusion with left-to-right collateral filling, medical management was recommended at that time.      When I saw the patient initially I did not recommend any further work since he appeared asymptomatic, had preserved left ventricular systolic function and his inferior wall motion abnormalities appeared to be chronic (present on prior left ventricular angiogram).  I did recommend that he start monitoring his blood pressures at home and keep a log to bring in 1 month.     Today presents for routine one-month follow-up.  Unfortunately he did not monitor his blood pressures at home although he was able to obtain a blood pressure cuff.  He remains asymptomatic and denies any chest pain, shortness of breath, PND or orthopnea, near-syncope or syncope, palpitations, or lower extremity edema.       Review of Systems   Constitution: Negative for weakness and malaise/fatigue.   HENT: Negative for hearing loss, hoarse voice, nosebleeds and sore throat.    Eyes: Negative for pain.   Cardiovascular: Negative for chest pain, claudication, cyanosis, dyspnea on exertion, irregular heartbeat, leg swelling, near-syncope, orthopnea, palpitations, paroxysmal nocturnal dyspnea and syncope.   Respiratory: Negative for shortness of breath and snoring.    Endocrine: Negative for cold intolerance, heat intolerance, polydipsia, polyphagia and polyuria.   Skin: Negative for itching and rash.   Musculoskeletal: Negative for arthritis, falls, joint pain, joint swelling, muscle cramps, muscle weakness and myalgias.   Gastrointestinal: Negative for constipation, diarrhea, dysphagia, heartburn, hematemesis, hematochezia, melena, nausea and vomiting.   Genitourinary: Negative for frequency, hematuria and hesitancy.   Neurological: Negative for excessive daytime sleepiness, dizziness, headaches, light-headedness and numbness.   Psychiatric/Behavioral: Negative for depression. The patient is not  nervous/anxious.           Current Outpatient Prescriptions:   •  amLODIPine (NORVASC) 5 MG tablet, Take 1 tablet by mouth Daily., Disp: 90 tablet, Rfl: 1  •  amLODIPine (NORVASC) 5 MG tablet, TAKE ONE TABLET BY MOUTH DAILY, Disp: 30 tablet, Rfl: 0  •  aspirin 81 MG chewable tablet, Chew., Disp: , Rfl:   •  atorvastatin (LIPITOR) 40 MG tablet, Take 1 tablet by mouth Daily., Disp: 90 tablet, Rfl: 1  •  cephalexin (KEFLEX) 500 MG capsule, Take 1 capsule by mouth 3 (Three) Times a Day., Disp: 21 capsule, Rfl: 0  •  clopidogrel (PLAVIX) 75 MG tablet, TAKE ONE TABLET BY MOUTH DAILY, Disp: 30 tablet, Rfl: 0  •  isosorbide mononitrate (IMDUR) 30 MG 24 hr tablet, TAKE ONE TABLET BY MOUTH DAILY, Disp: 90 tablet, Rfl: 0  •  metoprolol tartrate (LOPRESSOR) 50 MG tablet, TAKE ONE TABLET BY MOUTH TWICE A DAY, Disp: 180 tablet, Rfl: 0  •  nitroglycerin (NITROLINGUAL) 0.4 MG/SPRAY spray, Nitroglycerin 0.4 MG/SPRAY Translingual Solution; Patient Sig: Nitroglycerin 0.4 MG/SPRAY Translingual Solution ; 0; 16-Oct-2013; Active, Disp: , Rfl:   •  lisinopril (PRINIVIL,ZESTRIL) 20 MG tablet, Take 1 tablet by mouth Daily., Disp: 90 tablet, Rfl: 1    Past Medical History:   Diagnosis Date   • Atherosclerosis of native coronary artery of native heart without angina pectoris    • Bruit of right carotid artery    • Heart murmur    • Hyperlipidemia    • Hypertension      Past Surgical History:   Procedure Laterality Date   • [facility]     Family History   Problem Relation Age of Onset   • Heart disease Father    • Heart disease Brother      Social History   Substance Use Topics   • Smoking status: Never Smoker   • Smokeless tobacco: Not on file   • Alcohol use No           ECG 12 Lead  Date/Time: 10/30/2018 3:18 PM  Performed by: EUSEBIO CIFUENTES  Authorized by: EUSEBIO CIFUENTES   Comparison: compared with previous ECG   Similar to previous ECG  Rhythm: sinus rhythm              "  Objective:         Visit Vitals  /82 (BP Location: Right arm, Patient Position: Sitting)   Pulse 58   Ht 182.9 cm (72\")   Wt 83 kg (183 lb)   BMI 24.82 kg/m²          Physical Exam   Constitutional: He is oriented to person, place, and time. He appears well-developed and well-nourished.   HENT:   Head: Normocephalic and atraumatic.   Neck: No JVD present. Carotid bruit is not present.   Cardiovascular: Normal rate, regular rhythm, S1 normal and S2 normal.  Exam reveals no gallop.    No murmur heard.  Pulses:       Radial pulses are 2+ on the right side, and 2+ on the left side.   No bilateral lower extremity edema   Pulmonary/Chest: Effort normal and breath sounds normal.   Abdominal: Soft. Normal appearance.   Neurological: He is alert and oriented to person, place, and time.   Skin: Skin is warm, dry and intact.   Psychiatric: He has a normal mood and affect.       Lab Review:       Assessment:          Diagnosis Plan   1. Atherosclerosis of native coronary artery of native heart without angina pectoris     2. Essential hypertension     3. Hypercholesterolemia            Plan:       1.  Hypertension.  Blood pressures look better today in the office.  At this point which is continue to monitor his pressures further.  Have asked him to take his blood pressure at least once a week at home and keep a blood pressure log for me to review at her next follow-up.  If he does note that his systolic blood pressures are remaining in the 150s or above have asked him to notify me.  2.  Coronary artery disease.  Remains asymptomatic.  Continue medical management.  3.  Peripheral vascular disease.  Has known moderate stenosis of his right carotid artery.  4.  Hyperlipidemia.  On atorvastatin which is managed by Dr. Singh.    Will plan on seeing the patient again in 3 months.    Coronary Artery Disease  Assessment  • The patient has no angina    Plan  • Lifestyle modifications discussed include adhering to a heart " healthy diet, avoidance of tobacco products, maintenance of a healthy weight, medication compliance, regular exercise and regular monitoring of cholesterol and blood pressure    Subjective - Objective  • Current antiplatelet therapy includes aspirin 81 mg

## 2018-11-01 RX ORDER — ISOSORBIDE MONONITRATE 30 MG/1
TABLET, EXTENDED RELEASE ORAL
Qty: 30 TABLET | Refills: 0 | Status: SHIPPED | OUTPATIENT
Start: 2018-11-01 | End: 2018-12-07 | Stop reason: SDUPTHER

## 2018-11-01 RX ORDER — METOPROLOL TARTRATE 50 MG/1
TABLET, FILM COATED ORAL
Qty: 60 TABLET | Refills: 0 | Status: SHIPPED | OUTPATIENT
Start: 2018-11-01 | End: 2018-12-07 | Stop reason: SDUPTHER

## 2018-11-26 RX ORDER — CLOPIDOGREL BISULFATE 75 MG/1
TABLET ORAL
Qty: 90 TABLET | Refills: 2 | Status: SHIPPED | OUTPATIENT
Start: 2018-11-26 | End: 2019-09-30 | Stop reason: SDUPTHER

## 2018-11-26 RX ORDER — ATORVASTATIN CALCIUM 40 MG/1
TABLET, FILM COATED ORAL
Qty: 90 TABLET | Refills: 2 | Status: SHIPPED | OUTPATIENT
Start: 2018-11-26 | End: 2019-09-30 | Stop reason: SDUPTHER

## 2018-11-26 RX ORDER — AMLODIPINE BESYLATE 5 MG/1
TABLET ORAL
Qty: 90 TABLET | Refills: 2 | Status: SHIPPED | OUTPATIENT
Start: 2018-11-26 | End: 2019-02-20

## 2018-12-07 RX ORDER — METOPROLOL TARTRATE 50 MG/1
TABLET, FILM COATED ORAL
Qty: 60 TABLET | Refills: 0 | Status: SHIPPED | OUTPATIENT
Start: 2018-12-07 | End: 2019-01-10 | Stop reason: SDUPTHER

## 2018-12-07 RX ORDER — ISOSORBIDE MONONITRATE 30 MG/1
30 TABLET, EXTENDED RELEASE ORAL DAILY
Qty: 90 TABLET | Refills: 2 | Status: SHIPPED | OUTPATIENT
Start: 2018-12-07 | End: 2019-09-30 | Stop reason: SDUPTHER

## 2018-12-07 RX ORDER — ISOSORBIDE MONONITRATE 30 MG/1
TABLET, EXTENDED RELEASE ORAL
Qty: 30 TABLET | Refills: 0 | Status: SHIPPED | OUTPATIENT
Start: 2018-12-07 | End: 2018-12-07 | Stop reason: SDUPTHER

## 2019-01-11 RX ORDER — METOPROLOL TARTRATE 50 MG/1
TABLET, FILM COATED ORAL
Qty: 60 TABLET | Refills: 0 | Status: SHIPPED | OUTPATIENT
Start: 2019-01-11 | End: 2019-02-14 | Stop reason: SDUPTHER

## 2019-02-14 RX ORDER — METOPROLOL TARTRATE 50 MG/1
TABLET, FILM COATED ORAL
Qty: 180 TABLET | Refills: 1 | Status: SHIPPED | OUTPATIENT
Start: 2019-02-14 | End: 2019-05-21

## 2019-02-20 ENCOUNTER — OFFICE VISIT (OUTPATIENT)
Dept: CARDIOLOGY | Facility: CLINIC | Age: 64
End: 2019-02-20

## 2019-02-20 VITALS
BODY MASS INDEX: 25.47 KG/M2 | HEIGHT: 72 IN | DIASTOLIC BLOOD PRESSURE: 84 MMHG | WEIGHT: 188 LBS | HEART RATE: 70 BPM | SYSTOLIC BLOOD PRESSURE: 186 MMHG

## 2019-02-20 DIAGNOSIS — E78.00 HYPERCHOLESTEROLEMIA: ICD-10-CM

## 2019-02-20 DIAGNOSIS — I10 ESSENTIAL HYPERTENSION: ICD-10-CM

## 2019-02-20 DIAGNOSIS — I25.10 ATHEROSCLEROSIS OF NATIVE CORONARY ARTERY OF NATIVE HEART WITHOUT ANGINA PECTORIS: Primary | ICD-10-CM

## 2019-02-20 PROCEDURE — 99214 OFFICE O/P EST MOD 30 MIN: CPT | Performed by: INTERNAL MEDICINE

## 2019-02-20 PROCEDURE — 93000 ELECTROCARDIOGRAM COMPLETE: CPT | Performed by: INTERNAL MEDICINE

## 2019-02-20 RX ORDER — AMLODIPINE BESYLATE 10 MG/1
10 TABLET ORAL DAILY
Qty: 90 TABLET | Refills: 1 | Status: SHIPPED | OUTPATIENT
Start: 2019-02-20 | End: 2019-09-30 | Stop reason: SDUPTHER

## 2019-02-20 NOTE — PROGRESS NOTES
Subjective:     Encounter Date:02/20/2019      Patient ID: Blaine Jimenez is a 63 y.o. male.    Chief Complaint:  History of Present Illness    This is a 63-year-old with a history of coronary artery disease, hypertension, hyperlipidemia, peripheral vascular disease, who presents for follow up.       I saw the patient initially in 9/2018 when he presented for evaluation of an abnormal echocardiogram.  The patient had been seen for routine follow up by Dr. Singh at which time he was noted to have a murmur on exam.  He is set up for an echocardiogram for this reason which showed normal left ventricular systolic function with an EF of 56%, hypokinesis of his basal inferior, inferolateral, mid inferior, and mid inferolateral walls, normal diastolic function, and mild mitral regurgitation.  The patient denied any symptoms at the time.  His blood pressures were noted to be elevated.  Additionally he had a carotid ultrasound which showed moderate stenosis of his proximal right internal carotid artery and occlusion of a proximal left internal carotid artery.     His prior cardiac history includes the presentation with non-ST elevation MI and 10/2013.  At the time he presented with complaints of severe heartburn type symptoms.  On arrival he was noted to be severely hypertensive.  His troponins ended up being abnormal consistent with a non-ST elevation MI.  He underwent a cardiac catheterization on 10/9/2013 with Dr. Harper which showed normal left main, nonobstructive disease of his LAD, severe ostial disease of a small first diagonal branch, 90% ostial stenosis of a large ramus intermedius artery, moderate nonobstructive disease of the left circumflex artery, and severe distal stenosis of the right coronary artery with well-formed left to right collateral filling of the distal vessel.  His left ventricular angiogram showed akinesis of his basal to mid inferior wall.  Based on the presence of primarily branch  and distal vessel disease and a chronic total occlusion with left-to-right collateral filling, medical management was recommended at that time.       When I saw the patient initially I did not recommend any further work since he appeared asymptomatic, had preserved left ventricular systolic function and his inferior wall motion abnormalities appeared to be chronic (present on prior left ventricular angiogram).  I did recommend that he start monitoring his blood pressures.  When he returned last in follow up in 10/2018 he was doing well but had not been following his blood pressures.  His blood pressures look pretty good however at that time.     Today he presents for routine 3-month follow-up.  He brings in a list of blood pressure readings from this week that showed that his systolics have been mainly running in the 150s-170s.  His diastolics appear in the 80s-90s.  His heart rates have been in the 50s-60s.  The patient tells me today that he has been off of his lisinopril for about 6 months due to some renal insufficiency.  He is supposed to follow-up with Dr. Singh with repeat labs off of the lisinopril but has not gotten around to doing it.  He otherwise denies any chest pain, shortness of breath, PND or orthopnea, near-syncope or syncope, palpitations, or lower extremity edema.      Review of Systems   Constitution: Negative for weakness and malaise/fatigue.   HENT: Negative for hearing loss, hoarse voice, nosebleeds and sore throat.    Eyes: Negative for pain.   Cardiovascular: Negative for chest pain, claudication, cyanosis, dyspnea on exertion, irregular heartbeat, leg swelling, near-syncope, orthopnea, palpitations, paroxysmal nocturnal dyspnea and syncope.   Respiratory: Negative for shortness of breath and snoring.    Endocrine: Negative for cold intolerance, heat intolerance, polydipsia, polyphagia and polyuria.   Skin: Negative for itching and rash.   Musculoskeletal: Negative for arthritis, falls,  joint pain, joint swelling, muscle cramps, muscle weakness and myalgias.   Gastrointestinal: Negative for constipation, diarrhea, dysphagia, heartburn, hematemesis, hematochezia, melena, nausea and vomiting.   Genitourinary: Negative for frequency, hematuria and hesitancy.   Neurological: Negative for excessive daytime sleepiness, dizziness, headaches, light-headedness and numbness.   Psychiatric/Behavioral: Negative for depression. The patient is not nervous/anxious.           Current Outpatient Medications:   •  amLODIPine (NORVASC) 5 MG tablet, Take 1 tablet by mouth Daily., Disp: 90 tablet, Rfl: 1  •  amLODIPine (NORVASC) 5 MG tablet, TAKE ONE TABLET BY MOUTH DAILY, Disp: 90 tablet, Rfl: 2  •  aspirin 81 MG chewable tablet, Chew., Disp: , Rfl:   •  atorvastatin (LIPITOR) 40 MG tablet, TAKE ONE TABLET BY MOUTH DAILY, Disp: 90 tablet, Rfl: 2  •  clopidogrel (PLAVIX) 75 MG tablet, TAKE ONE TABLET BY MOUTH DAILY, Disp: 90 tablet, Rfl: 2  •  isosorbide mononitrate (IMDUR) 30 MG 24 hr tablet, Take 1 tablet by mouth Daily., Disp: 90 tablet, Rfl: 2  •  lisinopril (PRINIVIL,ZESTRIL) 20 MG tablet, Take 1 tablet by mouth Daily., Disp: 90 tablet, Rfl: 1  •  metoprolol tartrate (LOPRESSOR) 50 MG tablet, TAKE ONE TABLET BY MOUTH TWICE A DAY, Disp: 180 tablet, Rfl: 1    Past Medical History:   Diagnosis Date   • Atherosclerosis of native coronary artery of native heart without angina pectoris    • Bruit of right carotid artery    • Heart murmur    • Hyperlipidemia    • Hypertension      Past Surgical History:   Procedure Laterality Date   • [facility]  [facility]     Family History   Problem Relation Age of Onset   • Heart disease Father    • Heart disease Brother      Social History     Tobacco Use   • Smoking status: Never Smoker   Substance Use Topics   • Alcohol use: No   • Drug use: Defer           ECG 12 Lead  Date/Time: 2/20/2019 5:58 PM  Performed by: Lizzy Wong  "MD  Authorized by: Lizzy Wong MD   Comparison: compared with previous ECG   Similar to previous ECG  Rhythm: sinus rhythm               Objective:         Visit Vitals  BP (!) 186/84 (BP Location: Right arm, Patient Position: Sitting, Cuff Size: Adult) Comment: 2nd 172 80   Pulse 70   Ht 182.9 cm (72\")   Wt 85.3 kg (188 lb)   BMI 25.50 kg/m²          Physical Exam   Constitutional: He is oriented to person, place, and time. He appears well-developed and well-nourished.   HENT:   Head: Normocephalic and atraumatic.   Neck: No JVD present. Carotid bruit is not present.   Cardiovascular: Normal rate, regular rhythm, S1 normal and S2 normal. Exam reveals no gallop.   No murmur heard.  Pulses:       Radial pulses are 2+ on the right side, and 2+ on the left side.   No bilateral lower extremity edema   Pulmonary/Chest: Effort normal and breath sounds normal.   Abdominal: Soft. Normal appearance.   Neurological: He is alert and oriented to person, place, and time.   Skin: Skin is warm, dry and intact.   Psychiatric: He has a normal mood and affect.       Lab Review:       Assessment:          Diagnosis Plan   1. Atherosclerosis of native coronary artery of native heart without angina pectoris     2. Essential hypertension     3. Hypercholesterolemia            Plan:       1.  Coronary artery disease.  Remains asymptomatic on medical management.  Continue the same.  2.  Hypertension.  Elevated in the office today and have remained elevated at home.  This may be in part because he has been off the lisinopril.  We will go ahead and have him increase his amlodipine to 10 mg a day.  3.  Renal insufficiency.  Encourage the patient to follow-up with Dr. Singh regarding this.    4.  Hyperlipidemia.  On atorvastatin managed by Dr. Singh.    We will see the patient back again in another 3 months.    Coronary Artery Disease  Assessment  • The patient has no angina    Plan  • Lifestyle modifications discussed include " adhering to a heart healthy diet, avoidance of tobacco products, maintenance of a healthy weight, medication compliance, regular exercise and regular monitoring of cholesterol and blood pressure    Subjective - Objective  • Current antiplatelet therapy includes aspirin 81 mg

## 2019-05-21 ENCOUNTER — OFFICE VISIT (OUTPATIENT)
Dept: CARDIOLOGY | Facility: CLINIC | Age: 64
End: 2019-05-21

## 2019-05-21 VITALS
HEIGHT: 72 IN | BODY MASS INDEX: 25.47 KG/M2 | WEIGHT: 188 LBS | SYSTOLIC BLOOD PRESSURE: 158 MMHG | HEART RATE: 63 BPM | DIASTOLIC BLOOD PRESSURE: 98 MMHG

## 2019-05-21 DIAGNOSIS — I25.10 ATHEROSCLEROSIS OF NATIVE CORONARY ARTERY OF NATIVE HEART WITHOUT ANGINA PECTORIS: Primary | ICD-10-CM

## 2019-05-21 DIAGNOSIS — E78.00 HYPERCHOLESTEROLEMIA: ICD-10-CM

## 2019-05-21 DIAGNOSIS — I10 ESSENTIAL HYPERTENSION: ICD-10-CM

## 2019-05-21 PROBLEM — I65.23 BILATERAL CAROTID ARTERY STENOSIS: Status: ACTIVE | Noted: 2019-05-21

## 2019-05-21 PROCEDURE — 99214 OFFICE O/P EST MOD 30 MIN: CPT | Performed by: INTERNAL MEDICINE

## 2019-05-21 PROCEDURE — 93000 ELECTROCARDIOGRAM COMPLETE: CPT | Performed by: INTERNAL MEDICINE

## 2019-05-21 RX ORDER — CARVEDILOL 12.5 MG/1
12.5 TABLET ORAL 2 TIMES DAILY
Qty: 60 TABLET | Refills: 11 | Status: SHIPPED | OUTPATIENT
Start: 2019-05-21 | End: 2020-04-01 | Stop reason: SDUPTHER

## 2019-05-21 NOTE — PROGRESS NOTES
Subjective:     Encounter Date:05/21/2019      Patient ID: Blaine Jimenez is a 63 y.o. male.    Chief Complaint:  History of Present Illness    This is a 63-year-old with a history of coronary artery disease, hypertension, hyperlipidemia, peripheral vascular disease with left carotid artery occlusion, who presents for follow up.       I saw the patient initially in 9/2018 when he presented for evaluation of an abnormal echocardiogram.  The patient had been seen for routine follow up by Dr. Singh at which time he was noted to have a murmur on exam.  He is set up for an echocardiogram for this reason which showed normal left ventricular systolic function with an EF of 56%, hypokinesis of his basal inferior, inferolateral, mid inferior, and mid inferolateral walls, normal diastolic function, and mild mitral regurgitation.  The patient denied any symptoms at the time.  His blood pressures were noted to be elevated.  Additionally he had a carotid ultrasound which showed moderate stenosis of his proximal right internal carotid artery and occlusion of a proximal left internal carotid artery.     His prior cardiac history includes the presentation with non-ST elevation MI and 10/2013.  At the time he presented with complaints of severe heartburn type symptoms.  On arrival he was noted to be severely hypertensive.  His troponins ended up being abnormal consistent with a non-ST elevation MI.  He underwent a cardiac catheterization on 10/9/2013 with Dr. Harper which showed normal left main, nonobstructive disease of his LAD, severe ostial disease of a small first diagonal branch, 90% ostial stenosis of a large ramus intermedius artery, moderate nonobstructive disease of the left circumflex artery, and severe distal stenosis of the right coronary artery with well-formed left to right collateral filling of the distal vessel.  His left ventricular angiogram showed akinesis of his basal to mid inferior wall.  Based on  the presence of primarily branch and distal vessel disease and a chronic total occlusion with left-to-right collateral filling, medical management was recommended at that time.       When I saw the patient initially I did not recommend any further work since he appeared asymptomatic, had preserved left ventricular systolic function and his inferior wall motion abnormalities appeared to be chronic (present on prior left ventricular angiogram).  Since then I have primarily been working on blood pressure control.       When I saw the patient last he was noted to be hypertensive.  He reported that his pressures were in the 150s over 90s at home.  I felt that this was because his lisinopril was discontinued after he developed renal insufficiency and hyperkalemia.  I recommended increasing his amlodipine to 10 mg a day at that time.    Today presents for routine follow-up.  He reports that his blood pressures are still staying in the 150s over 90s.  They appear that way in the office today.  He otherwise denies any chest pain, shortness of breath, PND or orthopnea, near-syncope or syncope, lower extremity edema, or palpitations.  He denies any ill effects from the higher dose of the amlodipine.  He has not had any blood work done recently.  He is currently in between primary care physicians.    Review of Systems   Constitution: Negative for weakness and malaise/fatigue.   HENT: Negative for hearing loss, hoarse voice, nosebleeds and sore throat.    Eyes: Negative for pain.   Cardiovascular: Negative for chest pain, claudication, cyanosis, dyspnea on exertion, irregular heartbeat, leg swelling, near-syncope, orthopnea, palpitations, paroxysmal nocturnal dyspnea and syncope.   Respiratory: Negative for shortness of breath and snoring.    Endocrine: Negative for cold intolerance, heat intolerance, polydipsia, polyphagia and polyuria.   Skin: Negative for itching and rash.   Musculoskeletal: Negative for arthritis, falls,  joint pain, joint swelling, muscle cramps, muscle weakness and myalgias.   Gastrointestinal: Negative for constipation, diarrhea, dysphagia, heartburn, hematemesis, hematochezia, melena, nausea and vomiting.   Genitourinary: Negative for frequency, hematuria and hesitancy.   Neurological: Negative for excessive daytime sleepiness, dizziness, headaches, light-headedness and numbness.   Psychiatric/Behavioral: Negative for depression. The patient is not nervous/anxious.           Current Outpatient Medications:   •  amLODIPine (NORVASC) 10 MG tablet, Take 1 tablet by mouth Daily., Disp: 90 tablet, Rfl: 1  •  aspirin 81 MG chewable tablet, Chew., Disp: , Rfl:   •  atorvastatin (LIPITOR) 40 MG tablet, TAKE ONE TABLET BY MOUTH DAILY, Disp: 90 tablet, Rfl: 2  •  clopidogrel (PLAVIX) 75 MG tablet, TAKE ONE TABLET BY MOUTH DAILY, Disp: 90 tablet, Rfl: 2  •  isosorbide mononitrate (IMDUR) 30 MG 24 hr tablet, Take 1 tablet by mouth Daily., Disp: 90 tablet, Rfl: 2  •  metoprolol tartrate (LOPRESSOR) 50 MG tablet, TAKE ONE TABLET BY MOUTH TWICE A DAY, Disp: 180 tablet, Rfl: 1    Past Medical History:   Diagnosis Date   • Atherosclerosis of native coronary artery of native heart without angina pectoris    • Bruit of right carotid artery    • Heart murmur    • Hyperlipidemia    • Hypertension      Past Surgical History:   Procedure Laterality Date   • [facility]     Family History   Problem Relation Age of Onset   • Heart disease Father    • Heart disease Brother      Social History     Tobacco Use   • Smoking status: Never Smoker   • Smokeless tobacco: Never Used   • Tobacco comment: daily caffeine use   Substance Use Topics   • Alcohol use: No   • Drug use: Defer           ECG 12 Lead  Date/Time: 5/21/2019 10:09 AM  Performed by: Lizzy Wong MD  Authorized by: Lizzy Wong MD   Comparison: compared with previous ECG   Similar to previous ECG  Rhythm: sinus  "rhythm  Other findings: left ventricular hypertrophy               Objective:         Visit Vitals  /98 (BP Location: Right arm, Patient Position: Sitting)   Pulse 63   Ht 182.9 cm (72\")   Wt 85.3 kg (188 lb)   BMI 25.50 kg/m²          Physical Exam   Constitutional: He is oriented to person, place, and time. He appears well-developed and well-nourished.   HENT:   Head: Normocephalic and atraumatic.   Neck: No JVD present. Carotid bruit is not present.   Cardiovascular: Normal rate, regular rhythm, S1 normal and S2 normal. Exam reveals no gallop.   No murmur heard.  Pulses:       Radial pulses are 2+ on the right side, and 2+ on the left side.   No bilateral lower extremity edema   Pulmonary/Chest: Effort normal and breath sounds normal.   Abdominal: Soft. Normal appearance.   Neurological: He is alert and oriented to person, place, and time.   Skin: Skin is warm, dry and intact.   Psychiatric: He has a normal mood and affect.       Lab Review:       Assessment:          Diagnosis Plan   1. Atherosclerosis of native coronary artery of native heart without angina pectoris     2. Essential hypertension     3. Hypercholesterolemia            Plan:       1.  Hypertension.  Still not optimally controlled.  Would like to continue to adjust the medications he is on instead of anything further at this point.  Will discontinue his metoprolol tartrate and switch him to carvedilol since this will likely have a better blood pressure effect.  Will start at 12.5 mg twice a day.  2.  Coronary artery disease.  Remains asymptomatic on medical management.  We will continue the same.  3.  Renal insufficiency.  This was noted on his labs last year.  At the time his lisinopril was stopped since it was also associated with an elevated potassium.  We will need to make sure that he has a repeat basic metabolic panel after his next visit since he is currently in between primary care physicians.  4.  Hyperlipidemia.  On atorvastatin " which was previously followed by Dr. Singh.  We will plan on adding a lipid panel and a liver panel with his labs after our next visit.    We will see the patient back again in 3 months to follow-up on his blood pressures.  We will need to send him up for lab work including a CMP and fasting lipid panel at that time.    Coronary Artery Disease  Assessment  • The patient has no angina    Plan  • Lifestyle modifications discussed include adhering to a heart healthy diet, avoidance of tobacco products, maintenance of a healthy weight, medication compliance, regular exercise and regular monitoring of cholesterol and blood pressure    Subjective - Objective  • Current antiplatelet therapy includes aspirin 81 mg

## 2019-09-30 ENCOUNTER — OFFICE VISIT (OUTPATIENT)
Dept: INTERNAL MEDICINE | Facility: CLINIC | Age: 64
End: 2019-09-30

## 2019-09-30 VITALS
SYSTOLIC BLOOD PRESSURE: 188 MMHG | HEART RATE: 76 BPM | OXYGEN SATURATION: 97 % | BODY MASS INDEX: 25.03 KG/M2 | DIASTOLIC BLOOD PRESSURE: 96 MMHG | RESPIRATION RATE: 16 BRPM | HEIGHT: 72 IN | TEMPERATURE: 98 F | WEIGHT: 184.8 LBS

## 2019-09-30 DIAGNOSIS — Z23 NEEDS FLU SHOT: ICD-10-CM

## 2019-09-30 DIAGNOSIS — Z23 NEED FOR 23-POLYVALENT PNEUMOCOCCAL POLYSACCHARIDE VACCINE: ICD-10-CM

## 2019-09-30 DIAGNOSIS — E78.00 HYPERCHOLESTEROLEMIA: ICD-10-CM

## 2019-09-30 DIAGNOSIS — I10 ESSENTIAL HYPERTENSION: ICD-10-CM

## 2019-09-30 DIAGNOSIS — I65.23 BILATERAL CAROTID ARTERY STENOSIS: ICD-10-CM

## 2019-09-30 DIAGNOSIS — I25.10 ATHEROSCLEROSIS OF NATIVE CORONARY ARTERY OF NATIVE HEART WITHOUT ANGINA PECTORIS: ICD-10-CM

## 2019-09-30 DIAGNOSIS — Z76.89 ENCOUNTER TO ESTABLISH CARE: Primary | ICD-10-CM

## 2019-09-30 DIAGNOSIS — Z00.00 HEALTHCARE MAINTENANCE: ICD-10-CM

## 2019-09-30 PROCEDURE — 90653 IIV ADJUVANT VACCINE IM: CPT | Performed by: NURSE PRACTITIONER

## 2019-09-30 PROCEDURE — 90472 IMMUNIZATION ADMIN EACH ADD: CPT | Performed by: NURSE PRACTITIONER

## 2019-09-30 PROCEDURE — 90471 IMMUNIZATION ADMIN: CPT | Performed by: NURSE PRACTITIONER

## 2019-09-30 PROCEDURE — 90732 PPSV23 VACC 2 YRS+ SUBQ/IM: CPT | Performed by: NURSE PRACTITIONER

## 2019-09-30 PROCEDURE — 99214 OFFICE O/P EST MOD 30 MIN: CPT | Performed by: NURSE PRACTITIONER

## 2019-09-30 RX ORDER — ISOSORBIDE MONONITRATE 30 MG/1
30 TABLET, EXTENDED RELEASE ORAL DAILY
Qty: 90 TABLET | Refills: 2 | Status: SHIPPED | OUTPATIENT
Start: 2019-09-30 | End: 2020-04-01 | Stop reason: SDUPTHER

## 2019-09-30 RX ORDER — AMLODIPINE BESYLATE 10 MG/1
10 TABLET ORAL DAILY
Qty: 90 TABLET | Refills: 2 | Status: SHIPPED | OUTPATIENT
Start: 2019-09-30 | End: 2020-04-01 | Stop reason: SDUPTHER

## 2019-09-30 RX ORDER — ATORVASTATIN CALCIUM 40 MG/1
40 TABLET, FILM COATED ORAL DAILY
Qty: 90 TABLET | Refills: 2 | Status: SHIPPED | OUTPATIENT
Start: 2019-09-30 | End: 2020-06-21

## 2019-09-30 RX ORDER — CLOPIDOGREL BISULFATE 75 MG/1
75 TABLET ORAL DAILY
Qty: 90 TABLET | Refills: 2 | Status: SHIPPED | OUTPATIENT
Start: 2019-09-30 | End: 2020-04-01 | Stop reason: SDUPTHER

## 2019-09-30 NOTE — PATIENT INSTRUCTIONS
"Please call for a follow-up with Dr. Wong, cardiology and Dr. Ruelas, vascular.      Hypertension  Hypertension, commonly called high blood pressure, is when the force of blood pumping through the arteries is too strong. The arteries are the blood vessels that carry blood from the heart throughout the body. Hypertension forces the heart to work harder to pump blood and may cause arteries to become narrow or stiff. Having untreated or uncontrolled hypertension can cause heart attacks, strokes, kidney disease, and other problems.  A blood pressure reading consists of a higher number over a lower number. Ideally, your blood pressure should be below 120/80. The first (\"top\") number is called the systolic pressure. It is a measure of the pressure in your arteries as your heart beats. The second (\"bottom\") number is called the diastolic pressure. It is a measure of the pressure in your arteries as the heart relaxes.  What are the causes?  The cause of this condition is not known.  What increases the risk?  Some risk factors for high blood pressure are under your control. Others are not.  Factors you can change  · Smoking.  · Having type 2 diabetes mellitus, high cholesterol, or both.  · Not getting enough exercise or physical activity.  · Being overweight.  · Having too much fat, sugar, calories, or salt (sodium) in your diet.  · Drinking too much alcohol.  Factors that are difficult or impossible to change  · Having chronic kidney disease.  · Having a family history of high blood pressure.  · Age. Risk increases with age.  · Race. You may be at higher risk if you are -American.  · Gender. Men are at higher risk than women before age 45. After age 65, women are at higher risk than men.  · Having obstructive sleep apnea.  · Stress.  What are the signs or symptoms?  Extremely high blood pressure (hypertensive crisis) may cause:  · Headache.  · Anxiety.  · Shortness of breath.  · Nosebleed.  · Nausea and " vomiting.  · Severe chest pain.  · Jerky movements you cannot control (seizures).  How is this diagnosed?  This condition is diagnosed by measuring your blood pressure while you are seated, with your arm resting on a surface. The cuff of the blood pressure monitor will be placed directly against the skin of your upper arm at the level of your heart. It should be measured at least twice using the same arm. Certain conditions can cause a difference in blood pressure between your right and left arms.  Certain factors can cause blood pressure readings to be lower or higher than normal (elevated) for a short period of time:  · When your blood pressure is higher when you are in a health care provider's office than when you are at home, this is called white coat hypertension. Most people with this condition do not need medicines.  · When your blood pressure is higher at home than when you are in a health care provider's office, this is called masked hypertension. Most people with this condition may need medicines to control blood pressure.  If you have a high blood pressure reading during one visit or you have normal blood pressure with other risk factors:  · You may be asked to return on a different day to have your blood pressure checked again.  · You may be asked to monitor your blood pressure at home for 1 week or longer.  If you are diagnosed with hypertension, you may have other blood or imaging tests to help your health care provider understand your overall risk for other conditions.  How is this treated?  This condition is treated by making healthy lifestyle changes, such as eating healthy foods, exercising more, and reducing your alcohol intake. Your health care provider may prescribe medicine if lifestyle changes are not enough to get your blood pressure under control, and if:  · Your systolic blood pressure is above 130.  · Your diastolic blood pressure is above 80.  Your personal target blood pressure may vary  depending on your medical conditions, your age, and other factors.  Follow these instructions at home:  Eating and drinking    · Eat a diet that is high in fiber and potassium, and low in sodium, added sugar, and fat. An example eating plan is called the DASH (Dietary Approaches to Stop Hypertension) diet. To eat this way:  ? Eat plenty of fresh fruits and vegetables. Try to fill half of your plate at each meal with fruits and vegetables.  ? Eat whole grains, such as whole wheat pasta, brown rice, or whole grain bread. Fill about one quarter of your plate with whole grains.  ? Eat or drink low-fat dairy products, such as skim milk or low-fat yogurt.  ? Avoid fatty cuts of meat, processed or cured meats, and poultry with skin. Fill about one quarter of your plate with lean proteins, such as fish, chicken without skin, beans, eggs, and tofu.  ? Avoid premade and processed foods. These tend to be higher in sodium, added sugar, and fat.  · Reduce your daily sodium intake. Most people with hypertension should eat less than 1,500 mg of sodium a day.  · Limit alcohol intake to no more than 1 drink a day for nonpregnant women and 2 drinks a day for men. One drink equals 12 oz of beer, 5 oz of wine, or 1½ oz of hard liquor.  Lifestyle    · Work with your health care provider to maintain a healthy body weight or to lose weight. Ask what an ideal weight is for you.  · Get at least 30 minutes of exercise that causes your heart to beat faster (aerobic exercise) most days of the week. Activities may include walking, swimming, or biking.  · Include exercise to strengthen your muscles (resistance exercise), such as pilates or lifting weights, as part of your weekly exercise routine. Try to do these types of exercises for 30 minutes at least 3 days a week.  · Do not use any products that contain nicotine or tobacco, such as cigarettes and e-cigarettes. If you need help quitting, ask your health care provider.  · Monitor your blood  pressure at home as told by your health care provider.  · Keep all follow-up visits as told by your health care provider. This is important.  Medicines  · Take over-the-counter and prescription medicines only as told by your health care provider. Follow directions carefully. Blood pressure medicines must be taken as prescribed.  · Do not skip doses of blood pressure medicine. Doing this puts you at risk for problems and can make the medicine less effective.  · Ask your health care provider about side effects or reactions to medicines that you should watch for.  Contact a health care provider if:  · You think you are having a reaction to a medicine you are taking.  · You have headaches that keep coming back (recurring).  · You feel dizzy.  · You have swelling in your ankles.  · You have trouble with your vision.  Get help right away if:  · You develop a severe headache or confusion.  · You have unusual weakness or numbness.  · You feel faint.  · You have severe pain in your chest or abdomen.  · You vomit repeatedly.  · You have trouble breathing.  Summary  · Hypertension is when the force of blood pumping through your arteries is too strong. If this condition is not controlled, it may put you at risk for serious complications.  · Your personal target blood pressure may vary depending on your medical conditions, your age, and other factors. For most people, a normal blood pressure is less than 120/80.  · Hypertension is treated with lifestyle changes, medicines, or a combination of both. Lifestyle changes include weight loss, eating a healthy, low-sodium diet, exercising more, and limiting alcohol.  This information is not intended to replace advice given to you by your health care provider. Make sure you discuss any questions you have with your health care provider.  Document Released: 12/18/2006 Document Revised: 11/15/2017 Document Reviewed: 11/15/2017  [employer] Patient Education © 2019 [employer]  Zoster Vaccine, Recombinant injection  What is this medicine?  ZOSTER VACCINE (ZOS ter vak SEEN) is used to prevent shingles in adults 50 years old and over. This vaccine is not used to treat shingles or nerve pain from shingles.  This medicine may be used for other purposes; ask your health care provider or pharmacist if you have questions.  COMMON BRAND NAME(S): SHINGRIX  What should I tell my health care provider before I take this medicine?  They need to know if you have any of these conditions:  -blood disorders or disease  -cancer like leukemia or lymphoma  -immune system problems or therapy  -an unusual or allergic reaction to vaccines, other medications, foods, dyes, or preservatives  -pregnant or trying to get pregnant  -breast-feeding  How should I use this medicine?  This vaccine is for injection in a muscle. It is given by a health care professional.  Talk to your pediatrician regarding the use of this medicine in children. This medicine is not approved for use in children.  Overdosage: If you think you have taken too much of this medicine contact a poison control center or emergency room at once.  NOTE: This medicine is only for you. Do not share this medicine with others.  What if I miss a dose?  Keep appointments for follow-up (booster) doses as directed. It is important not to miss your dose. Call your doctor or health care professional if you are unable to keep an appointment.  What may interact with this medicine?  -medicines that suppress your immune system  -medicines to treat cancer  -steroid medicines like prednisone or cortisone  This list may not describe all possible interactions. Give your health care provider a list of all the medicines, herbs, non-prescription drugs, or dietary supplements you use. Also tell them if you smoke, drink alcohol, or use illegal drugs. Some items may interact with your medicine.  What should I watch for while using this medicine?  Visit your doctor for  regular check ups.  This vaccine, like all vaccines, may not fully protect everyone.  What side effects may I notice from receiving this medicine?  Side effects that you should report to your doctor or health care professional as soon as possible:  -allergic reactions like skin rash, itching or hives, swelling of the face, lips, or tongue  -breathing problems  Side effects that usually do not require medical attention (report these to your doctor or health care professional if they continue or are bothersome):  -chills  -headache  -fever  -nausea, vomiting  -redness, warmth, pain, swelling or itching at site where injected  -tiredness  This list may not describe all possible side effects. Call your doctor for medical advice about side effects. You may report side effects to FDA at 7-565-HKW-3474.  Where should I keep my medicine?  This vaccine is only given in a clinic, pharmacy, doctor's office, or other health care setting and will not be stored at home.  NOTE: This sheet is a summary. It may not cover all possible information. If you have questions about this medicine, talk to your doctor, pharmacist, or health care provider.  © 2019 [employer] (2018-07-30 13:20:30)

## 2019-09-30 NOTE — PROGRESS NOTES
Subjective   Blaine Jimenez is a 64 y.o. male.   CC: Establish care, elevated blood pressure    Patient presents for transfer of care.  This is a 64-year-old male former patient of Dr. Singh.  This patient is new to me.    He has a history of hyperlipidemia and takes atorvastatin 40 mg daily.    For hypertension he takes amlodipine 10 mg daily, carvedilol 12.5 mg twice daily and Imdur 30 mg daily.  He reports excellent compliance and toleration of these medications but states that he has been out of the Imdur and amlodipine for several days.  His blood pressure is elevated today at 88/96.  He reports that he just took the carvedilol about 1/2-hour ago.  He reports no headache, visual changes, shortness of breath or chest discomfort.    He has a history of bilateral carotid artery stenosis.  His last Doppler of the carotids was on 7/5/2018.  He follows with Dr. Ruelas, vascular surgery.    He has a history of coronary artery disease.  He does follow with Dr. Wong, cardiology.  He takes Plavix and baby aspirin daily.  He reports no chest discomfort or palpitations.    He denies development of any other new issues today.         The following portions of the patient's history were reviewed and updated as appropriate: allergies, current medications, past family history, past medical history, past social history, past surgical history and problem list.    Review of Systems   Constitutional: Negative for activity change, chills, fatigue, fever, unexpected weight gain and unexpected weight loss.   HENT: Negative for congestion, hearing loss, postnasal drip, sinus pressure, sneezing, sore throat, swollen glands and tinnitus.    Eyes: Negative for photophobia, pain and visual disturbance.   Respiratory: Negative for cough, chest tightness, shortness of breath and wheezing.    Cardiovascular: Negative for chest pain, palpitations and leg swelling.   Gastrointestinal: Negative for abdominal distention, abdominal pain,  "constipation, diarrhea, nausea and vomiting.   Endocrine: Negative for polydipsia, polyphagia and polyuria.   Genitourinary: Negative for dysuria, frequency, hematuria and urgency.   Neurological: Negative for dizziness, weakness, numbness and headache.   All other systems reviewed and are negative.      Objective    BP (!) 188/96 Comment: manual recheck  Pulse 76   Temp 98 °F (36.7 °C) (Oral)   Resp 16   Ht 182.9 cm (72\")   Wt 83.8 kg (184 lb 12.8 oz)   SpO2 97%   BMI 25.06 kg/m²     Physical Exam   Constitutional: He is oriented to person, place, and time. He appears well-developed and well-nourished. No distress.   HENT:   Head: Normocephalic and atraumatic.   Right Ear: External ear normal.   Left Ear: External ear normal.   Nose: Nose normal.   Mouth/Throat: Oropharynx is clear and moist.   Eyes: EOM are normal. Pupils are equal, round, and reactive to light.   Neck: Normal range of motion. Neck supple.   Cardiovascular: Normal rate, regular rhythm, normal heart sounds and intact distal pulses. Exam reveals no gallop and no friction rub.   No murmur heard.  Bilateral posterior tib pulses 2+ and equal.  No peripheral edema.   Pulmonary/Chest: Effort normal and breath sounds normal. No stridor. No respiratory distress. He has no wheezes. He has no rales. He exhibits no tenderness.   Lungs are CTA bilaterally   Abdominal: Soft. Bowel sounds are normal. He exhibits no distension. There is no tenderness.   Musculoskeletal: Normal range of motion.   Neurological: He is alert and oriented to person, place, and time.   Skin: Skin is warm and dry. Capillary refill takes less than 2 seconds. He is not diaphoretic.   Psychiatric: He has a normal mood and affect. His behavior is normal. Judgment and thought content normal.   Nursing note and vitals reviewed.    Current outpatient and discharge medications have been reconciled for the patient.  Reviewed by: AILYN Estrella      Assessment/Plan   Blaine was seen " today for follow-up and blood pressure check.    Diagnoses and all orders for this visit:    Encounter to establish care    Hypercholesterolemia  -     Lipid panel; Future  -     atorvastatin (LIPITOR) 40 MG tablet; Take 1 tablet by mouth Daily.    Essential hypertension  -     CBC & Differential; Future  -     Comprehensive metabolic panel; Future  -     amLODIPine (NORVASC) 10 MG tablet; Take 1 tablet by mouth Daily.  -     isosorbide mononitrate (IMDUR) 30 MG 24 hr tablet; Take 1 tablet by mouth Daily.    Bilateral carotid artery stenosis    Atherosclerosis of native coronary artery of native heart without angina pectoris    Healthcare maintenance  -     Hepatitis C antibody; Future  -     PSA SCREENING; Future    Needs flu shot  -     Fluad Tri 65yr+    Need for 23-polyvalent pneumococcal polysaccharide vaccine  -     Pneumococcal Polysaccharide Vaccine 23-Valent (PPSV23) Greater Than or Equal To 3yo Subcutaneous / IM    Other orders  -     clopidogrel (PLAVIX) 75 MG tablet; Take 1 tablet by mouth Daily.    -Hyperlipidemia: We will check a lipid panel when he is fasting later this week.  Continue atorvastatin 40 mg daily and will adjust therapy if needed based on labs.    -Hypertension: He has been off of his blood pressure medications for several days due to running out.  Blood pressure today is 188/96.  He has taken his Coreg about 1/2-hour ago.  Refill his Imdur and amlodipine.  I have asked him to monitor his blood pressure consistently, daily at home and go to the emergency room for any readings above 200/100.  He is provided with education regarding hypertension and I will see him back in 2 months for follow-up with his blood pressure readings from home.    -Bilateral carotid artery stenosis: He needs a follow-up with Dr. Ruelas his vascular surgeon and have asked him to make one.  Last carotid Doppler was 7/5/2018.    -Healthcare maintenance: He is due for his flu shot today which we will administer.  He  needs Pneumovax 23 as well which we will administer.  We will get a one-time hep C screening per guidelines as well as his PSA.  We discussed Shingrix and he is provided with printed education and will pursue this at his pharmacy.    -We will contact patient with results of his labs and any further recommendations.  Follow-up PRN and I will see him back in 2 months for a follow-up on his blood pressure with readings.

## 2019-10-07 ENCOUNTER — RESULTS ENCOUNTER (OUTPATIENT)
Dept: INTERNAL MEDICINE | Facility: CLINIC | Age: 64
End: 2019-10-07

## 2019-10-07 DIAGNOSIS — E78.00 HYPERCHOLESTEROLEMIA: ICD-10-CM

## 2019-10-07 DIAGNOSIS — I10 ESSENTIAL HYPERTENSION: ICD-10-CM

## 2019-10-07 DIAGNOSIS — Z00.00 HEALTHCARE MAINTENANCE: ICD-10-CM

## 2019-10-08 LAB
ALBUMIN SERPL-MCNC: 4.4 G/DL (ref 3.5–5.2)
ALBUMIN/GLOB SERPL: 1.8 G/DL
ALP SERPL-CCNC: 126 U/L (ref 39–117)
ALT SERPL-CCNC: 20 U/L (ref 1–41)
AST SERPL-CCNC: 19 U/L (ref 1–40)
BASOPHILS # BLD AUTO: (no result) 10*3/UL
BILIRUB SERPL-MCNC: 0.3 MG/DL (ref 0.2–1.2)
BUN SERPL-MCNC: 15 MG/DL (ref 8–23)
BUN/CREAT SERPL: 16.9 (ref 7–25)
CALCIUM SERPL-MCNC: 9.2 MG/DL (ref 8.6–10.5)
CHLORIDE SERPL-SCNC: 101 MMOL/L (ref 98–107)
CHOLEST SERPL-MCNC: 167 MG/DL (ref 0–200)
CO2 SERPL-SCNC: 25.6 MMOL/L (ref 22–29)
CREAT SERPL-MCNC: 0.89 MG/DL (ref 0.76–1.27)
DIFFERENTIAL COMMENT: NORMAL
EOSINOPHIL # BLD AUTO: (no result) 10*3/UL
EOSINOPHIL # BLD MANUAL: 0.12 10*3/MM3 (ref 0–0.4)
EOSINOPHIL NFR BLD AUTO: (no result) %
EOSINOPHIL NFR BLD MANUAL: 2 % (ref 0.3–6.2)
ERYTHROCYTE [DISTWIDTH] IN BLOOD BY AUTOMATED COUNT: 12.5 % (ref 12.3–15.4)
GLOBULIN SER CALC-MCNC: 2.4 GM/DL
GLUCOSE SERPL-MCNC: 118 MG/DL (ref 65–99)
HCT VFR BLD AUTO: 42.5 % (ref 37.5–51)
HCV AB S/CO SERPL IA: <0.1 S/CO RATIO (ref 0–0.9)
HDLC SERPL-MCNC: 35 MG/DL (ref 40–60)
HGB BLD-MCNC: 14.6 G/DL (ref 13–17.7)
LDLC SERPL CALC-MCNC: 107 MG/DL (ref 0–100)
LYMPHOCYTES # BLD AUTO: (no result) 10*3/UL
LYMPHOCYTES # BLD MANUAL: 1.22 10*3/MM3 (ref 0.7–3.1)
LYMPHOCYTES NFR BLD AUTO: (no result) %
LYMPHOCYTES NFR BLD MANUAL: 20.2 % (ref 19.6–45.3)
MCH RBC QN AUTO: 31.3 PG (ref 26.6–33)
MCHC RBC AUTO-ENTMCNC: 34.4 G/DL (ref 31.5–35.7)
MCV RBC AUTO: 91 FL (ref 79–97)
MONOCYTES # BLD MANUAL: 0.67 10*3/MM3 (ref 0.1–0.9)
MONOCYTES NFR BLD AUTO: (no result) %
MONOCYTES NFR BLD MANUAL: 11.1 % (ref 5–12)
NEUTROPHILS # BLD MANUAL: 4.04 10*3/MM3 (ref 1.7–7)
NEUTROPHILS NFR BLD AUTO: (no result) %
NEUTROPHILS NFR BLD MANUAL: 66.7 % (ref 42.7–76)
PLATELET # BLD AUTO: 270 10*3/MM3 (ref 140–450)
PLATELET BLD QL SMEAR: NORMAL
POTASSIUM SERPL-SCNC: 4.8 MMOL/L (ref 3.5–5.2)
PROT SERPL-MCNC: 6.8 G/DL (ref 6–8.5)
PSA SERPL-MCNC: 0.39 NG/ML (ref 0–4)
RBC # BLD AUTO: 4.67 10*6/MM3 (ref 4.14–5.8)
RBC MORPH BLD: NORMAL
SODIUM SERPL-SCNC: 139 MMOL/L (ref 136–145)
TRIGL SERPL-MCNC: 124 MG/DL (ref 0–150)
VLDLC SERPL CALC-MCNC: 24.8 MG/DL
WBC # BLD AUTO: 6.05 10*3/MM3 (ref 3.4–10.8)

## 2019-10-08 NOTE — PROGRESS NOTES
Please notify patient that his blood count looks stable.  Metabolic panel looks stable except for 1 slightly elevated liver enzyme and a high blood sugar of 118.  I would like to check an A1c when he comes back in November.  Cholesterol looked stable with slightly elevated LDL.  PSA is normal.  Please let me know of any questions or concerns and I will see him back next month.

## 2020-01-02 ENCOUNTER — OFFICE VISIT (OUTPATIENT)
Dept: INTERNAL MEDICINE | Facility: CLINIC | Age: 65
End: 2020-01-02

## 2020-01-02 VITALS
SYSTOLIC BLOOD PRESSURE: 191 MMHG | HEIGHT: 72 IN | BODY MASS INDEX: 25.09 KG/M2 | WEIGHT: 185.2 LBS | RESPIRATION RATE: 16 BRPM | DIASTOLIC BLOOD PRESSURE: 98 MMHG | HEART RATE: 76 BPM | OXYGEN SATURATION: 97 % | TEMPERATURE: 98.3 F

## 2020-01-02 DIAGNOSIS — F41.1 GENERALIZED ANXIETY DISORDER: ICD-10-CM

## 2020-01-02 DIAGNOSIS — I10 ESSENTIAL HYPERTENSION: Primary | ICD-10-CM

## 2020-01-02 PROCEDURE — 99214 OFFICE O/P EST MOD 30 MIN: CPT | Performed by: NURSE PRACTITIONER

## 2020-01-02 RX ORDER — BUSPIRONE HYDROCHLORIDE 5 MG/1
5 TABLET ORAL 2 TIMES DAILY
Qty: 60 TABLET | Refills: 2 | Status: SHIPPED | OUTPATIENT
Start: 2020-01-02 | End: 2020-03-30

## 2020-01-02 RX ORDER — HYDRALAZINE HYDROCHLORIDE 25 MG/1
25 TABLET, FILM COATED ORAL 2 TIMES DAILY
Qty: 60 TABLET | Refills: 2 | Status: SHIPPED | OUTPATIENT
Start: 2020-01-02 | End: 2020-03-02 | Stop reason: DRUGHIGH

## 2020-01-02 NOTE — PROGRESS NOTES
"Subjective   Blaine Jimenez is a 64 y.o. male.   CC: Hypertension follow-up, anxiety    Patient presents for 2-month follow-up on hypertension.  This is a 64-year-old male  with a history of MI.  He follows with cardiology, Dr. Wong.  I saw him last on 9/30/2019.  At that time he had been off of several of his blood pressure medications due to running out of refills.  We refilled his blood pressure medications including amlodipine 10 mg daily, Imdur 30 mg daily and he continued on his carvedilol 12.5 mg twice daily as prescribed by cardiology.  I asked him to monitor his blood pressures daily and record them in a log for follow-up.  He presents today for follow-up.  Blood pressures despite the above-mentioned antihypertensives have remained between 150 and 180 systolically over 90s diastolically.  He denies headache, visual changes, shortness of breath or chest discomfort.  He reports that he drinks 3-4 alcoholic beverages 3 to 4 days/week.  He reports that he never drinks more than 4 beers at once.  He does state that he believes he could decrease the sodium in his diet.  He also endorses anxiety which has been an issue for him for quite some time.  He endorses taking clonazepam in the past for anxiety which did help.  He endorses no anxiety attacks but does state \"I feel like there is always a constant underlying tense feeling/anxious feeling.\"  He does deny SI or HI.  He believes this may be contributing to high blood pressure as well.  He denies headache, visual changes, shortness of breath or chest discomfort denies development of any other new issues today.       The following portions of the patient's history were reviewed and updated as appropriate: allergies, current medications, past family history, past medical history, past social history, past surgical history and problem list.    Review of Systems   Constitutional: Negative for activity change, chills, fatigue, fever, unexpected weight gain and " "unexpected weight loss.   HENT: Negative for congestion, hearing loss, postnasal drip, sinus pressure, sneezing, sore throat, swollen glands and tinnitus.    Eyes: Negative for photophobia, pain and visual disturbance.   Respiratory: Negative for cough, chest tightness, shortness of breath and wheezing.    Cardiovascular: Negative for chest pain, palpitations and leg swelling.   Gastrointestinal: Negative for abdominal distention, abdominal pain, constipation, diarrhea, nausea and vomiting.   Endocrine: Negative for polydipsia, polyphagia and polyuria.   Genitourinary: Negative for dysuria, frequency, hematuria and urgency.   Neurological: Negative for dizziness, weakness, numbness and headache.   Psychiatric/Behavioral: Positive for stress. Negative for suicidal ideas. The patient is nervous/anxious.    All other systems reviewed and are negative.      Objective    BP (!) 191/98 (BP Location: Left arm, Patient Position: Sitting, Cuff Size: Small Adult)   Pulse 76   Temp 98.3 °F (36.8 °C) (Oral)   Resp 16   Ht 182.9 cm (72\")   Wt 84 kg (185 lb 3.2 oz)   SpO2 97%   BMI 25.12 kg/m²     Physical Exam   Constitutional: He is oriented to person, place, and time. He appears well-developed and well-nourished. No distress.   HENT:   Head: Normocephalic and atraumatic.   Eyes: Pupils are equal, round, and reactive to light. EOM are normal.   Neck: Normal range of motion. Neck supple.   Cardiovascular: Normal rate, regular rhythm, normal heart sounds and intact distal pulses. Exam reveals no gallop and no friction rub.   No murmur heard.  No peripheral edema.  Posterior tib pulses 2+ and equal bilaterally.   Pulmonary/Chest: Effort normal and breath sounds normal. No stridor. No respiratory distress. He has no wheezes. He has no rales. He exhibits no tenderness.   Lungs are CTA bilaterally   Abdominal: Soft. Bowel sounds are normal. He exhibits no distension. There is no tenderness.   Musculoskeletal: Normal range of " motion.   Neurological: He is alert and oriented to person, place, and time.   Skin: Skin is warm and dry. Capillary refill takes less than 2 seconds. He is not diaphoretic.   Psychiatric: He has a normal mood and affect. His behavior is normal. Judgment and thought content normal.   Nursing note and vitals reviewed.    Current outpatient and discharge medications have been reconciled for the patient.  Reviewed by: AILYN Estrella      Assessment/Plan   Diagnoses and all orders for this visit:    Essential hypertension  -     hydrALAZINE (APRESOLINE) 25 MG tablet; Take 1 tablet by mouth 2 (Two) Times a Day.  -     Urinalysis With Microscopic - Urine, Clean Catch    Generalized anxiety disorder  -     busPIRone (BUSPAR) 5 MG tablet; Take 1 tablet by mouth 2 (Two) Times a Day.    -Hypertension: Running between 150 and 180 systolically at home over 90s diastolically.  In the office today he is 191/98.  We will add hydralazine 25 mg twice daily to his existing amlodipine, Imdur and carvedilol.  I have recommended continuing with home monitoring and we will follow-up in 1 to 2 months on hypertension and the initiation of the hydralazine.  I did ask him to follow-up with Dr. Wong, his cardiologist as well as it appears that he is due for follow-up.    -We discussed decreasing sodium in his diet as well as alcoholic beverages for additional treatment of hypertension.    -Anxiety: We will add buspirone 5 mg twice daily.  Anxiety could be contributing to his hypertension as well.    -We will see patient back in 1 to 2 months for follow-up on anxiety and hypertension and the initiation of the buspirone and hydralazine.  Follow-up PRN in the meantime.

## 2020-01-03 LAB
APPEARANCE UR: CLEAR
BACTERIA #/AREA URNS HPF: NORMAL /HPF
BILIRUB UR QL STRIP: NEGATIVE
CASTS URNS MICRO: NORMAL
COLOR UR: YELLOW
EPI CELLS #/AREA URNS HPF: NORMAL /HPF
GLUCOSE UR QL: NEGATIVE
HGB UR QL STRIP: NEGATIVE
KETONES UR QL STRIP: NEGATIVE
LEUKOCYTE ESTERASE UR QL STRIP: NEGATIVE
NITRITE UR QL STRIP: NEGATIVE
PH UR STRIP: 6.5 [PH] (ref 5–8)
PROT UR QL STRIP: NEGATIVE
RBC #/AREA URNS HPF: NORMAL /HPF
SP GR UR: 1.01 (ref 1–1.03)
UROBILINOGEN UR STRIP-MCNC: NORMAL MG/DL
WBC #/AREA URNS HPF: NORMAL /HPF

## 2020-03-02 ENCOUNTER — HOSPITAL ENCOUNTER (OUTPATIENT)
Dept: GENERAL RADIOLOGY | Facility: HOSPITAL | Age: 65
Discharge: HOME | End: 2020-03-02

## 2020-03-02 ENCOUNTER — HOSPITAL ENCOUNTER (OUTPATIENT)
Dept: GENERAL RADIOLOGY | Facility: HOSPITAL | Age: 65
Discharge: HOME | End: 2020-03-02
Admitting: NURSE PRACTITIONER

## 2020-03-02 ENCOUNTER — OFFICE VISIT (OUTPATIENT)
Dept: INTERNAL MEDICINE | Facility: CLINIC | Age: 65
End: 2020-03-02

## 2020-03-02 VITALS
RESPIRATION RATE: 16 BRPM | TEMPERATURE: 98.2 F | WEIGHT: 190.4 LBS | SYSTOLIC BLOOD PRESSURE: 187 MMHG | HEIGHT: 72 IN | BODY MASS INDEX: 25.79 KG/M2 | OXYGEN SATURATION: 95 % | HEART RATE: 72 BPM | DIASTOLIC BLOOD PRESSURE: 91 MMHG

## 2020-03-02 DIAGNOSIS — I10 ESSENTIAL HYPERTENSION: Primary | ICD-10-CM

## 2020-03-02 DIAGNOSIS — M54.2 CERVICAL SPINE PAIN: ICD-10-CM

## 2020-03-02 DIAGNOSIS — G89.29 CHRONIC LEFT SHOULDER PAIN: ICD-10-CM

## 2020-03-02 DIAGNOSIS — M25.512 CHRONIC LEFT SHOULDER PAIN: ICD-10-CM

## 2020-03-02 DIAGNOSIS — F41.1 GENERALIZED ANXIETY DISORDER: ICD-10-CM

## 2020-03-02 PROCEDURE — 99214 OFFICE O/P EST MOD 30 MIN: CPT | Performed by: NURSE PRACTITIONER

## 2020-03-02 PROCEDURE — 73030 X-RAY EXAM OF SHOULDER: CPT

## 2020-03-02 PROCEDURE — 72040 X-RAY EXAM NECK SPINE 2-3 VW: CPT

## 2020-03-02 RX ORDER — HYDRALAZINE HYDROCHLORIDE 50 MG/1
50 TABLET, FILM COATED ORAL 3 TIMES DAILY
Qty: 90 TABLET | Refills: 1 | Status: SHIPPED | OUTPATIENT
Start: 2020-03-02 | End: 2020-04-22

## 2020-03-02 NOTE — PROGRESS NOTES
Please notify patient that his cervical spine xray is showing normal cervical vertebra alignment but is showing some mild loss of disc heigh/degenerative changes. His left shoulder xray is normal showing no evidence of fracture or dislocation. Please try the voltaren gel that I ordered but if symptoms persist or worsen I would like to pursue MRI and/or physical therapy. Please let me know if symptoms persist or worsen.
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
## 2020-03-25 ENCOUNTER — TELEPHONE (OUTPATIENT)
Dept: CARDIOLOGY | Facility: CLINIC | Age: 65
End: 2020-03-25

## 2020-03-25 NOTE — TELEPHONE ENCOUNTER
3/25/2020    Left voicemail for patient to call back to confirm if a telephone or phone visit is acceptable.

## 2020-03-25 NOTE — TELEPHONE ENCOUNTER
Patient is appropriate for a telephone or video visit. Please contact him and find out if he is agreeable and if so convert his appt to whichever type he wants and give him instructions on the logistics of how that appointment works. Please route back to me if he has any concerns/question that needs to be answered by a provider.  Thanks.

## 2020-03-25 NOTE — TELEPHONE ENCOUNTER
Spoke to patient. He refused a telephone or video visit and wanted to schedule for Dr. Wong's first available, which was July 7. He stated if he had any issues or concerns he would call in. Thanks.

## 2020-03-29 DIAGNOSIS — F41.1 GENERALIZED ANXIETY DISORDER: ICD-10-CM

## 2020-03-30 RX ORDER — BUSPIRONE HYDROCHLORIDE 5 MG/1
TABLET ORAL
Qty: 60 TABLET | Refills: 1 | Status: SHIPPED | OUTPATIENT
Start: 2020-03-30 | End: 2020-04-01 | Stop reason: SDUPTHER

## 2020-04-01 ENCOUNTER — TELEPHONE (OUTPATIENT)
Dept: INTERNAL MEDICINE | Facility: CLINIC | Age: 65
End: 2020-04-01

## 2020-04-01 DIAGNOSIS — F41.1 GENERALIZED ANXIETY DISORDER: ICD-10-CM

## 2020-04-01 DIAGNOSIS — I10 ESSENTIAL HYPERTENSION: ICD-10-CM

## 2020-04-01 DIAGNOSIS — I25.10 ATHEROSCLEROSIS OF NATIVE CORONARY ARTERY OF NATIVE HEART WITHOUT ANGINA PECTORIS: ICD-10-CM

## 2020-04-01 RX ORDER — BUSPIRONE HYDROCHLORIDE 5 MG/1
5 TABLET ORAL 2 TIMES DAILY
Qty: 180 TABLET | Refills: 1 | Status: SHIPPED | OUTPATIENT
Start: 2020-04-01 | End: 2020-05-13

## 2020-04-01 RX ORDER — AMLODIPINE BESYLATE 10 MG/1
10 TABLET ORAL DAILY
Qty: 90 TABLET | Refills: 1 | Status: SHIPPED | OUTPATIENT
Start: 2020-04-01 | End: 2020-12-23

## 2020-04-01 RX ORDER — CLOPIDOGREL BISULFATE 75 MG/1
75 TABLET ORAL DAILY
Qty: 90 TABLET | Refills: 1 | Status: SHIPPED | OUTPATIENT
Start: 2020-04-01 | End: 2020-08-27 | Stop reason: SDUPTHER

## 2020-04-01 RX ORDER — CARVEDILOL 12.5 MG/1
12.5 TABLET ORAL 2 TIMES DAILY
Qty: 180 TABLET | Refills: 1 | Status: SHIPPED | OUTPATIENT
Start: 2020-04-01 | End: 2020-08-27 | Stop reason: SDUPTHER

## 2020-04-01 RX ORDER — ISOSORBIDE MONONITRATE 30 MG/1
30 TABLET, EXTENDED RELEASE ORAL DAILY
Qty: 90 TABLET | Refills: 1 | Status: SHIPPED | OUTPATIENT
Start: 2020-04-01 | End: 2020-08-27 | Stop reason: SDUPTHER

## 2020-04-01 NOTE — TELEPHONE ENCOUNTER
Pt is going to try to set up his mychart and if he's not able to we will do a telephone visit.  Pt to call me back today and let me know if he was successful in setting up mychart.      ----- Message from AILYN Estrella sent at 4/1/2020  9:39 AM EDT -----  Regarding: Apt tomorrow   Tomorrow he is supposed to have a one month HTN follow up. Can we see if we can get him set up for AndroJekPhil Campbell to do a video visit or do a phone visit?

## 2020-04-02 ENCOUNTER — TELEMEDICINE (OUTPATIENT)
Dept: INTERNAL MEDICINE | Facility: CLINIC | Age: 65
End: 2020-04-02

## 2020-04-02 DIAGNOSIS — I10 ESSENTIAL HYPERTENSION: Primary | ICD-10-CM

## 2020-04-02 PROCEDURE — 99213 OFFICE O/P EST LOW 20 MIN: CPT | Performed by: NURSE PRACTITIONER

## 2020-04-02 NOTE — PROGRESS NOTES
"Subjective   Blaine Jimenez is a 64 y.o. male.   Chief Complaint   Patient presents with   • Hypertension       Patient presents for video visit to discuss hypertension. This is a 64 YOM. I saw him last in-person in the office on 3/2/2020. At that time blood pressures were still running in the 180s/90s. I increased his hydralazine to 50 mg TID and had him continue with Imdur 30 mg daily, amlodipine 10 mg daily, and carvedilol 12.5 mg BID. Since the increase in the hydralazine pt has been monitoring BP more closely at home. He states, \"it has dropped on average a good 20 to 30 points on the top number.\" He reports systolic BP readings in the 140s consistently with occasional readings in the low 150s, and diastolic readings consistently between 75-85. Denies headache, visual changes, SOA or chest discomfort. No fever or chills. Reports no side effects with the increase in hydralazine dosage. He has also cut down greatly on fast food and has been packing his own lunch for work. He denies development of any other new issues today and states that overall he is feeling very well.        The following portions of the patient's history were reviewed and updated as appropriate: allergies, current medications, past family history, past medical history, past social history, past surgical history and problem list.    Review of Systems   Constitutional: Negative for activity change, chills, fatigue, fever, unexpected weight gain and unexpected weight loss.   HENT: Negative for congestion, hearing loss, postnasal drip, sinus pressure, sneezing, sore throat, swollen glands and tinnitus.    Eyes: Negative for photophobia, pain and visual disturbance.   Respiratory: Negative for cough, chest tightness, shortness of breath and wheezing.    Cardiovascular: Negative for chest pain, palpitations and leg swelling.   Gastrointestinal: Negative for abdominal distention, abdominal pain, constipation, diarrhea, nausea and vomiting. "   Endocrine: Negative for polydipsia, polyphagia and polyuria.   Genitourinary: Negative for dysuria, frequency, hematuria and urgency.   Neurological: Negative for dizziness, weakness, numbness and headache.   All other systems reviewed and are negative.      Objective    There were no vitals taken for this visit.    Physical Exam   Constitutional: He is oriented to person, place, and time. He appears well-developed and well-nourished. No distress.   Pulmonary/Chest: Effort normal.   Neurological: He is alert and oriented to person, place, and time.   Skin: He is not diaphoretic.   Psychiatric: He has a normal mood and affect. His behavior is normal. Judgment and thought content normal.     Current outpatient and discharge medications have been reconciled for the patient.  Reviewed by: AILYN Estrella      Assessment/Plan   Blaine was seen today for hypertension.    Diagnoses and all orders for this visit:    Essential hypertension      -Reviewed home readings with patient. Still slightly above goal but major improvement since last visit when he was running 180s/90s. Continue hydralazine 50 mg TID, carvedilol 12.5 mg BID, amlodipine 10 mg daily and Imdur 30 mg daily. Encouraged continued BP monitoring 4-5 times weekly.     -Discussed further lifestyle modifications to help increase hypertensive control including cutting back on fatty foods, increasing physical activity and decreasing alcohol intake. He has made significant changes in decreasing the amount of fast food he is eating and will continue to make modifications.     - Follow-up PRN and we will have him back in 3-4 months for routine health maintenance/follow-up on chronic conditions.       Due to the COVID-19 crisis, this visit was performed via [facility] video visit, therefore no hands-on physical assessment was performed.

## 2020-04-22 DIAGNOSIS — I10 ESSENTIAL HYPERTENSION: ICD-10-CM

## 2020-04-22 RX ORDER — HYDRALAZINE HYDROCHLORIDE 50 MG/1
TABLET, FILM COATED ORAL
Qty: 90 TABLET | Refills: 0 | Status: SHIPPED | OUTPATIENT
Start: 2020-04-22 | End: 2020-04-24 | Stop reason: SDUPTHER

## 2020-04-24 DIAGNOSIS — I10 ESSENTIAL HYPERTENSION: ICD-10-CM

## 2020-04-24 RX ORDER — HYDRALAZINE HYDROCHLORIDE 50 MG/1
50 TABLET, FILM COATED ORAL 3 TIMES DAILY
Qty: 270 TABLET | Refills: 0 | Status: SHIPPED | OUTPATIENT
Start: 2020-04-24 | End: 2020-08-25 | Stop reason: SDUPTHER

## 2020-05-12 DIAGNOSIS — F41.1 GENERALIZED ANXIETY DISORDER: ICD-10-CM

## 2020-05-13 RX ORDER — BUSPIRONE HYDROCHLORIDE 5 MG/1
TABLET ORAL
Qty: 60 TABLET | Refills: 1 | Status: SHIPPED | OUTPATIENT
Start: 2020-05-13 | End: 2020-10-12

## 2020-06-21 DIAGNOSIS — E78.00 HYPERCHOLESTEROLEMIA: ICD-10-CM

## 2020-06-21 RX ORDER — ATORVASTATIN CALCIUM 40 MG/1
TABLET, FILM COATED ORAL
Qty: 90 TABLET | Refills: 0 | Status: SHIPPED | OUTPATIENT
Start: 2020-06-21 | End: 2020-08-27 | Stop reason: SDUPTHER

## 2020-08-03 ENCOUNTER — OFFICE VISIT (OUTPATIENT)
Dept: INTERNAL MEDICINE | Facility: CLINIC | Age: 65
End: 2020-08-03

## 2020-08-03 VITALS
BODY MASS INDEX: 24.27 KG/M2 | SYSTOLIC BLOOD PRESSURE: 168 MMHG | DIASTOLIC BLOOD PRESSURE: 82 MMHG | HEIGHT: 72 IN | HEART RATE: 72 BPM | TEMPERATURE: 98.3 F | OXYGEN SATURATION: 95 % | WEIGHT: 179.2 LBS | RESPIRATION RATE: 16 BRPM

## 2020-08-03 DIAGNOSIS — I65.23 BILATERAL CAROTID ARTERY STENOSIS: ICD-10-CM

## 2020-08-03 DIAGNOSIS — E78.00 HYPERCHOLESTEROLEMIA: Primary | ICD-10-CM

## 2020-08-03 DIAGNOSIS — I10 ESSENTIAL HYPERTENSION: ICD-10-CM

## 2020-08-03 DIAGNOSIS — Z12.11 ENCOUNTER FOR SCREENING COLONOSCOPY: ICD-10-CM

## 2020-08-03 DIAGNOSIS — I25.10 ATHEROSCLEROSIS OF NATIVE CORONARY ARTERY OF NATIVE HEART WITHOUT ANGINA PECTORIS: ICD-10-CM

## 2020-08-03 DIAGNOSIS — R73.01 ELEVATED FASTING BLOOD SUGAR: ICD-10-CM

## 2020-08-03 DIAGNOSIS — F41.1 GENERALIZED ANXIETY DISORDER: ICD-10-CM

## 2020-08-03 DIAGNOSIS — Z00.00 HEALTHCARE MAINTENANCE: ICD-10-CM

## 2020-08-03 LAB
ALBUMIN SERPL-MCNC: 4.7 G/DL (ref 3.5–5.2)
ALBUMIN/GLOB SERPL: 2.4 G/DL
ALP SERPL-CCNC: 107 U/L (ref 39–117)
ALT SERPL-CCNC: 23 U/L (ref 1–41)
AST SERPL-CCNC: 19 U/L (ref 1–40)
BILIRUB SERPL-MCNC: 0.5 MG/DL (ref 0–1.2)
BUN SERPL-MCNC: 19 MG/DL (ref 8–23)
BUN/CREAT SERPL: 20.4 (ref 7–25)
CALCIUM SERPL-MCNC: 9.5 MG/DL (ref 8.6–10.5)
CHLORIDE SERPL-SCNC: 100 MMOL/L (ref 98–107)
CHOLEST SERPL-MCNC: 150 MG/DL (ref 0–200)
CO2 SERPL-SCNC: 25.9 MMOL/L (ref 22–29)
CREAT SERPL-MCNC: 0.93 MG/DL (ref 0.76–1.27)
ERYTHROCYTE [DISTWIDTH] IN BLOOD BY AUTOMATED COUNT: 12.7 % (ref 12.3–15.4)
GLOBULIN SER CALC-MCNC: 2 GM/DL
GLUCOSE SERPL-MCNC: 102 MG/DL (ref 65–99)
HBA1C MFR BLD: 5.6 % (ref 4.8–5.6)
HCT VFR BLD AUTO: 44.4 % (ref 37.5–51)
HDLC SERPL-MCNC: 47 MG/DL (ref 40–60)
HGB BLD-MCNC: 14.4 G/DL (ref 13–17.7)
LDLC SERPL CALC-MCNC: 79 MG/DL (ref 0–100)
MCH RBC QN AUTO: 30.6 PG (ref 26.6–33)
MCHC RBC AUTO-ENTMCNC: 32.4 G/DL (ref 31.5–35.7)
MCV RBC AUTO: 94.5 FL (ref 79–97)
PLATELET # BLD AUTO: 278 10*3/MM3 (ref 140–450)
POTASSIUM SERPL-SCNC: 4.4 MMOL/L (ref 3.5–5.2)
PROT SERPL-MCNC: 6.7 G/DL (ref 6–8.5)
RBC # BLD AUTO: 4.7 10*6/MM3 (ref 4.14–5.8)
SODIUM SERPL-SCNC: 137 MMOL/L (ref 136–145)
T4 FREE SERPL-MCNC: 1.06 NG/DL (ref 0.93–1.7)
TRIGL SERPL-MCNC: 119 MG/DL (ref 0–150)
TSH SERPL DL<=0.005 MIU/L-ACNC: 1.89 UIU/ML (ref 0.27–4.2)
VLDLC SERPL CALC-MCNC: 23.8 MG/DL
WBC # BLD AUTO: 7.54 10*3/MM3 (ref 3.4–10.8)

## 2020-08-03 PROCEDURE — 99214 OFFICE O/P EST MOD 30 MIN: CPT | Performed by: NURSE PRACTITIONER

## 2020-08-03 NOTE — PATIENT INSTRUCTIONS
"Please make a follow up on your carotid artery stenosis with Dr. Ruelas.    Please call me if blood pressures at home are consistently >130/80s.     DASH Eating Plan  DASH stands for \"Dietary Approaches to Stop Hypertension.\" The DASH eating plan is a healthy eating plan that has been shown to reduce high blood pressure (hypertension). It may also reduce your risk for type 2 diabetes, heart disease, and stroke. The DASH eating plan may also help with weight loss.  What are tips for following this plan?    General guidelines  · Avoid eating more than 2,300 mg (milligrams) of salt (sodium) a day. If you have hypertension, you may need to reduce your sodium intake to 1,500 mg a day.  · Limit alcohol intake to no more than 1 drink a day for nonpregnant women and 2 drinks a day for men. One drink equals 12 oz of beer, 5 oz of wine, or 1½ oz of hard liquor.  · Work with your health care provider to maintain a healthy body weight or to lose weight. Ask what an ideal weight is for you.  · Get at least 30 minutes of exercise that causes your heart to beat faster (aerobic exercise) most days of the week. Activities may include walking, swimming, or biking.  · Work with your health care provider or diet and nutrition specialist (dietitian) to adjust your eating plan to your individual calorie needs.  Reading food labels    · Check food labels for the amount of sodium per serving. Choose foods with less than 5 percent of the Daily Value of sodium. Generally, foods with less than 300 mg of sodium per serving fit into this eating plan.  · To find whole grains, look for the word \"whole\" as the first word in the ingredient list.  Shopping  · Buy products labeled as \"low-sodium\" or \"no salt added.\"  · Buy fresh foods. Avoid canned foods and premade or frozen meals.  Cooking  · Avoid adding salt when cooking. Use salt-free seasonings or herbs instead of table salt or sea salt. Check with your health care provider or pharmacist before " using salt substitutes.  · Do not ron foods. Cook foods using healthy methods such as baking, boiling, grilling, and broiling instead.  · Cook with heart-healthy oils, such as olive, canola, soybean, or sunflower oil.  Meal planning  · Eat a balanced diet that includes:  ? 5 or more servings of fruits and vegetables each day. At each meal, try to fill half of your plate with fruits and vegetables.  ? Up to 6-8 servings of whole grains each day.  ? Less than 6 oz of lean meat, poultry, or fish each day. A 3-oz serving of meat is about the same size as a deck of cards. One egg equals 1 oz.  ? 2 servings of low-fat dairy each day.  ? A serving of nuts, seeds, or beans 5 times each week.  ? Heart-healthy fats. Healthy fats called Omega-3 fatty acids are found in foods such as flaxseeds and coldwater fish, like sardines, salmon, and mackerel.  · Limit how much you eat of the following:  ? Canned or prepackaged foods.  ? Food that is high in trans fat, such as fried foods.  ? Food that is high in saturated fat, such as fatty meat.  ? Sweets, desserts, sugary drinks, and other foods with added sugar.  ? Full-fat dairy products.  · Do not salt foods before eating.  · Try to eat at least 2 vegetarian meals each week.  · Eat more home-cooked food and less restaurant, buffet, and fast food.  · When eating at a restaurant, ask that your food be prepared with less salt or no salt, if possible.  What foods are recommended?  The items listed may not be a complete list. Talk with your dietitian about what dietary choices are best for you.  Grains  Whole-grain or whole-wheat bread. Whole-grain or whole-wheat pasta. Brown rice. Oatmeal. Quinoa. Bulgur. Whole-grain and low-sodium cereals. Yennifer bread. Low-fat, low-sodium crackers. Whole-wheat flour tortillas.  Vegetables  Fresh or frozen vegetables (raw, steamed, roasted, or grilled). Low-sodium or reduced-sodium tomato and vegetable juice. Low-sodium or reduced-sodium tomato sauce and  tomato paste. Low-sodium or reduced-sodium canned vegetables.  Fruits  All fresh, dried, or frozen fruit. Canned fruit in natural juice (without added sugar).  Meat and other protein foods  Skinless chicken or turkey. Ground chicken or turkey. Pork with fat trimmed off. Fish and seafood. Egg whites. Dried beans, peas, or lentils. Unsalted nuts, nut butters, and seeds. Unsalted canned beans. Lean cuts of beef with fat trimmed off. Low-sodium, lean deli meat.  Dairy  Low-fat (1%) or fat-free (skim) milk. Fat-free, low-fat, or reduced-fat cheeses. Nonfat, low-sodium ricotta or cottage cheese. Low-fat or nonfat yogurt. Low-fat, low-sodium cheese.  Fats and oils  Soft margarine without trans fats. Vegetable oil. Low-fat, reduced-fat, or light mayonnaise and salad dressings (reduced-sodium). Canola, safflower, olive, soybean, and sunflower oils. Avocado.  Seasoning and other foods  Herbs. Spices. Seasoning mixes without salt. Unsalted popcorn and pretzels. Fat-free sweets.  What foods are not recommended?  The items listed may not be a complete list. Talk with your dietitian about what dietary choices are best for you.  Grains  Baked goods made with fat, such as croissants, muffins, or some breads. Dry pasta or rice meal packs.  Vegetables  Creamed or fried vegetables. Vegetables in a cheese sauce. Regular canned vegetables (not low-sodium or reduced-sodium). Regular canned tomato sauce and paste (not low-sodium or reduced-sodium). Regular tomato and vegetable juice (not low-sodium or reduced-sodium). Pickles. Olives.  Fruits  Canned fruit in a light or heavy syrup. Fried fruit. Fruit in cream or butter sauce.  Meat and other protein foods  Fatty cuts of meat. Ribs. Fried meat. Her. Sausage. Bologna and other processed lunch meats. Salami. Fatback. Hotdogs. Bratwurst. Salted nuts and seeds. Canned beans with added salt. Canned or smoked fish. Whole eggs or egg yolks. Chicken or turkey with skin.  Dairy  Whole or 2%  milk, cream, and half-and-half. Whole or full-fat cream cheese. Whole-fat or sweetened yogurt. Full-fat cheese. Nondairy creamers. Whipped toppings. Processed cheese and cheese spreads.  Fats and oils  Butter. Stick margarine. Lard. Shortening. Ghee. Her fat. Tropical oils, such as coconut, palm kernel, or palm oil.  Seasoning and other foods  Salted popcorn and pretzels. Onion salt, garlic salt, seasoned salt, table salt, and sea salt. Worcestershire sauce. Tartar sauce. Barbecue sauce. Teriyaki sauce. Soy sauce, including reduced-sodium. Steak sauce. Canned and packaged gravies. Fish sauce. Oyster sauce. Cocktail sauce. Horseradish that you find on the shelf. Ketchup. Mustard. Meat flavorings and tenderizers. Bouillon cubes. Hot sauce and Tabasco sauce. Premade or packaged marinades. Premade or packaged taco seasonings. Relishes. Regular salad dressings.  Where to find more information:  · National Heart, Lung, and [employer] [place]: www.nhlbi.nih.gov  · American Heart Association: www.heart.org  Summary  · The DASH eating plan is a healthy eating plan that has been shown to reduce high blood pressure (hypertension). It may also reduce your risk for type 2 diabetes, heart disease, and stroke.  · With the DASH eating plan, you should limit salt (sodium) intake to 2,300 mg a day. If you have hypertension, you may need to reduce your sodium intake to 1,500 mg a day.  · When on the DASH eating plan, aim to eat more fresh fruits and vegetables, whole grains, lean proteins, low-fat dairy, and heart-healthy fats.  · Work with your health care provider or diet and nutrition specialist (dietitian) to adjust your eating plan to your individual calorie needs.  This information is not intended to replace advice given to you by your health care provider. Make sure you discuss any questions you have with your health care provider.  Document Released: 12/06/2012 Document Revised: 11/30/2018 Document Reviewed:  12/11/2017  Elsevier Patient Education © 2020 [employer]

## 2020-08-03 NOTE — PROGRESS NOTES
"Subjective   Blaine Jimenez is a 65 y.o. male.   Chief Complaint   Patient presents with   • Follow-up     Pt presents here today for a 6 month follow up.   HTN    Patient presents for 6-month follow-up.  This is a 65-year-old male.    He has hypertension and takes multiple antihypertensive medications.  Historically it has been a bit resistant.  He takes amlodipine 10 mg daily, hydralazine 50 mg 3 times daily, Imdur 30 mg daily and Coreg 12.5 mg twice daily.  With the exception of the hydralazine he reports good compliance with these medications.  He reports that he often forgets the midday dose of hydralazine due to being at work.  Blood pressure today in the office is elevated at 162/82.  He denies headache, visual changes, shortness of breath or chest discomfort.  For the past 1 month he has not been checking his blood pressure consistently but prior to this he was checking it at home with readings in the 130s to 140s over 70s consistently.  He does state \"I have been eating a lot more fast food and salt recently.\"    He has RITA and takes BuSpar 5 mg twice daily.  No SI or HI and this works well for him.    He has hyperlipidemia and takes atorvastatin 40 mg daily reporting good compliance with this medication.    He has bilateral carotid stenosis and does take Plavix.  He follows with Dr. Carlos Ruelas.  His last carotid Dopplers on 3/7/2019.  He is due for recheck.    He has coronary artery disease and takes Plavix.  Followed by Dr. Wong and he has an appointment on 8/20/2020 for follow-up.    He has never had a colonoscopy.  Reports that he is open to getting one ordered.    He has had an 11 pound weight loss in the past 5 months.  Denies making any dietary changes but has been doing more physical activity at work.    History of elevated fasting blood sugar, 118 in October 2019.    Denies development of any other new issues today.    Hypertension   This is a chronic problem. The current episode started more " "than 1 year ago. The problem has been gradually improving since onset. The problem is resistant. Pertinent negatives include no anxiety, blurred vision, chest pain, headaches, malaise/fatigue, neck pain, orthopnea, palpitations, peripheral edema, PND, shortness of breath or sweats. There are no associated agents to hypertension. Risk factors for coronary artery disease include family history. There are no compliance problems.         The following portions of the patient's history were reviewed and updated as appropriate: allergies, current medications, past family history, past medical history, past social history, past surgical history and problem list.    Review of Systems   Constitutional: Negative for activity change, chills, fatigue, fever, malaise/fatigue, unexpected weight gain and unexpected weight loss.   HENT: Negative for congestion, hearing loss, postnasal drip, sinus pressure, sneezing, sore throat, swollen glands and tinnitus.    Eyes: Negative for blurred vision, photophobia, pain and visual disturbance.   Respiratory: Negative for cough, chest tightness, shortness of breath and wheezing.    Cardiovascular: Negative for chest pain, palpitations, orthopnea, leg swelling and PND.   Gastrointestinal: Negative for abdominal distention, abdominal pain, constipation, diarrhea, nausea and vomiting.   Endocrine: Negative for polydipsia, polyphagia and polyuria.   Genitourinary: Negative for dysuria, frequency, hematuria and urgency.   Musculoskeletal: Negative for neck pain.   Neurological: Negative for dizziness, weakness, numbness and headache.   All other systems reviewed and are negative.      Objective    /82 (BP Location: Left arm, Patient Position: Sitting, Cuff Size: Adult)   Pulse 72   Temp 98.3 °F (36.8 °C) (Oral)   Resp 16   Ht 182.9 cm (72\")   Wt 81.3 kg (179 lb 3.2 oz)   SpO2 95%   BMI 24.30 kg/m²     Physical Exam   Constitutional: He is oriented to person, place, and time. He " appears well-developed and well-nourished. No distress.   HENT:   Head: Normocephalic and atraumatic.   Eyes: Pupils are equal, round, and reactive to light. EOM are normal.   Neck: Normal range of motion. Neck supple. No JVD present. No tracheal deviation present. No thyromegaly present.   Right carotid bruit auscultated.   Cardiovascular: Normal rate, regular rhythm, normal heart sounds and intact distal pulses. Exam reveals no gallop and no friction rub.   No murmur heard.  No peripheral edema. Posterior tib pulses 2+ and equal bilat.   Pulmonary/Chest: Effort normal and breath sounds normal. No stridor. No respiratory distress. He has no wheezes. He has no rales. He exhibits no tenderness.   Lungs CTA bilat.   Abdominal: Soft. Bowel sounds are normal. He exhibits no distension and no mass. There is no tenderness. There is no rebound and no guarding. No hernia.   bs active x 4 quad. No pain to light or deep palpation x 4 quad.   Musculoskeletal: Normal range of motion.   Lymphadenopathy:     He has no cervical adenopathy.   Neurological: He is alert and oriented to person, place, and time.   Skin: Skin is warm and dry. Capillary refill takes less than 2 seconds. He is not diaphoretic.   Psychiatric: He has a normal mood and affect. His behavior is normal. Judgment and thought content normal.   Nursing note and vitals reviewed.    Current outpatient and discharge medications have been reconciled for the patient.  Reviewed by: AILYN Estrella      Assessment/Plan   Blaine was seen today for follow-up.    Diagnoses and all orders for this visit:    Hypercholesterolemia  -     Lipid panel    Essential hypertension  -     CBC No Differential  -     Comprehensive metabolic panel  -     TSH  -     T4, Free    Generalized anxiety disorder    Atherosclerosis of native coronary artery of native heart without angina pectoris    Healthcare maintenance  -     Ambulatory Referral For Screening Colonoscopy    Bilateral  carotid artery stenosis    Elevated fasting blood sugar  -     Hemoglobin A1c    Encounter for screening colonoscopy  -     Ambulatory Referral For Screening Colonoscopy      -HLD: Continue atorvastatin 40 mg daily. Lipid panel today and we will adjust therapy if needed based on labs.     -HTN: BP high today at 168/82. He has not been compliant with the TID dosing of hydralazine and I stressed the importance of this to gain better control of HTN. Continue amlodipine, Imdur, Coreg as well. We discussed decreasing dietary sodium intake and he is going to reevaluate his daily diet and make some changes including less processed and fast foods. He will log his BP daily and bring readings to his upcoming cardiology apt as well as let me know if readings remain >130/80 despite the interventions we discussed today.     -RITA: Stable with BuSpar 5 mg twice daily, continue current therapy.    -CAD: No cardiac symptoms, chest pain or shortness of breath.  Continue Plavix as directed by cardiology and he sees Dr. Wong on 8/20/2020.    -Healthcare maintenance: Screening colonoscopy ordered and we discussed the importance that he proceed with this.  He is agreeable.  Fasting labs today.    -Bilateral carotid stenosis: Discussed the importance of following up with repeat ultrasound.  He will make an appointment with Dr. Ruelas for recheck and US.     -Elevated fasting blood sugar: A1c today and discussed dietary modifications.    We will contact pt with his lab results and any further recommendations. Follow up PRN and I will see him back in six months for initial AWV.

## 2020-08-04 NOTE — PROGRESS NOTES
Please notify patient that blood count looks stable, no anemia.  Metabolic panel looks stable including kidney, liver function and electrolytes.  Cholesterol panel looks great.  Thyroid labs are normal.  A1c is stable at 5.60.

## 2020-08-20 ENCOUNTER — OFFICE VISIT (OUTPATIENT)
Dept: CARDIOLOGY | Facility: CLINIC | Age: 65
End: 2020-08-20

## 2020-08-20 VITALS
SYSTOLIC BLOOD PRESSURE: 178 MMHG | BODY MASS INDEX: 24.24 KG/M2 | HEART RATE: 59 BPM | DIASTOLIC BLOOD PRESSURE: 92 MMHG | HEIGHT: 72 IN | WEIGHT: 179 LBS

## 2020-08-20 DIAGNOSIS — F41.1 GENERALIZED ANXIETY DISORDER: ICD-10-CM

## 2020-08-20 DIAGNOSIS — E78.00 HYPERCHOLESTEROLEMIA: ICD-10-CM

## 2020-08-20 DIAGNOSIS — I10 ESSENTIAL HYPERTENSION: ICD-10-CM

## 2020-08-20 DIAGNOSIS — I25.10 ATHEROSCLEROSIS OF NATIVE CORONARY ARTERY OF NATIVE HEART WITHOUT ANGINA PECTORIS: Primary | ICD-10-CM

## 2020-08-20 PROCEDURE — 99214 OFFICE O/P EST MOD 30 MIN: CPT | Performed by: INTERNAL MEDICINE

## 2020-08-20 PROCEDURE — 93000 ELECTROCARDIOGRAM COMPLETE: CPT | Performed by: INTERNAL MEDICINE

## 2020-08-20 NOTE — PROGRESS NOTES
Subjective:     Encounter Date:08/20/2020      Patient ID: Blaine Jimenez is a 65 y.o. male.    Chief Complaint:  History of Present Illness    This is a 65-year-old with a history of coronary artery disease, hypertension, hyperlipidemia, peripheral vascular disease with left carotid artery occlusion, who presents for follow up.       He presents today for annual follow-up.  Since I saw him last hydralazine was increased to 50 mg 3 times daily.  He does report that on occasion he forgets his afternoon dose but is doing better now that he is set an alarm for himself.  He is lost weight since he has become more active at work.  He denies any chest pain, shortness of breath, palpitations, orthopnea, near-syncope or syncope, or lower extremity edema.  He brings in some blood pressure readings over the last couple weeks which show systolics running in the 130s with occasional readings in the 150s and 160s and normal diastolic pressures.    Prior History:  I saw the patient initially in 9/2018 when he presented for evaluation of an abnormal echocardiogram.  The patient had been seen for routine follow up by Dr. Singh at which time he was noted to have a murmur on exam.  He is set up for an echocardiogram for this reason which showed normal left ventricular systolic function with an EF of 56%, hypokinesis of his basal inferior, inferolateral, mid inferior, and mid inferolateral walls, normal diastolic function, and mild mitral regurgitation.  The patient denied any symptoms at the time.  His blood pressures were noted to be elevated.  Additionally he had a carotid ultrasound which showed moderate stenosis of his proximal right internal carotid artery and occlusion of a proximal left internal carotid artery.     His prior cardiac history includes the presentation with non-ST elevation MI and 10/2013.  At the time he presented with complaints of severe heartburn type symptoms.  On arrival he was noted to be  severely hypertensive.  His troponins ended up being abnormal consistent with a non-ST elevation MI.  He underwent a cardiac catheterization on 10/9/2013 with Dr. Harper which showed normal left main, nonobstructive disease of his LAD, severe ostial disease of a small first diagonal branch, 90% ostial stenosis of a large ramus intermedius artery, moderate nonobstructive disease of the left circumflex artery, and severe distal stenosis of the right coronary artery with well-formed left to right collateral filling of the distal vessel.  His left ventricular angiogram showed akinesis of his basal to mid inferior wall.  Based on the presence of primarily branch and distal vessel disease and a chronic total occlusion with left-to-right collateral filling, medical management was recommended at that time.       When I saw the patient initially I did not recommend any further work since he appeared asymptomatic, had preserved left ventricular systolic function and his inferior wall motion abnormalities appeared to be chronic (present on prior left ventricular angiogram).  Since then I have primarily been working on blood pressure control.    Lisinopril had to be discontinued after he developed renal insufficiency and hyperkalemia.  He was then started on hydralazine and isosorbide mononitrate in its place.  Between myself and AILYN Estrella we have been working on titrating his medications.    Review of Systems   Constitution: Negative for malaise/fatigue.   HENT: Negative for hearing loss, hoarse voice, nosebleeds and sore throat.    Eyes: Negative for pain.   Cardiovascular: Negative for chest pain, claudication, cyanosis, dyspnea on exertion, irregular heartbeat, leg swelling, near-syncope, orthopnea, palpitations, paroxysmal nocturnal dyspnea and syncope.   Respiratory: Negative for shortness of breath and snoring.    Endocrine: Negative for cold intolerance, heat intolerance, polydipsia, polyphagia and polyuria.    Skin: Negative for itching and rash.   Musculoskeletal: Negative for arthritis, falls, joint pain, joint swelling, muscle cramps, muscle weakness and myalgias.   Gastrointestinal: Negative for constipation, diarrhea, dysphagia, heartburn, hematemesis, hematochezia, melena, nausea and vomiting.   Genitourinary: Negative for frequency, hematuria and hesitancy.   Neurological: Negative for excessive daytime sleepiness, dizziness, headaches, light-headedness, numbness and weakness.   Psychiatric/Behavioral: Negative for depression. The patient is not nervous/anxious.          Current Outpatient Medications:   •  amLODIPine (NORVASC) 10 MG tablet, Take 1 tablet by mouth Daily., Disp: 90 tablet, Rfl: 1  •  aspirin 81 MG chewable tablet, Chew., Disp: , Rfl:   •  atorvastatin (LIPITOR) 40 MG tablet, TAKE 1 TABLET BY MOUTH EVERY DAY, Disp: 90 tablet, Rfl: 0  •  busPIRone (BUSPAR) 5 MG tablet, TAKE 1 TABLET BY MOUTH TWICE A DAY, Disp: 60 tablet, Rfl: 1  •  carvedilol (COREG) 12.5 MG tablet, Take 1 tablet by mouth 2 (Two) Times a Day., Disp: 180 tablet, Rfl: 1  •  clopidogrel (PLAVIX) 75 MG tablet, Take 1 tablet by mouth Daily., Disp: 90 tablet, Rfl: 1  •  diclofenac (VOLTAREN) 1 % gel gel, Apply 4 g topically to the appropriate area as directed 3 (Three) Times a Day As Needed (joint or back pain)., Disp: 100 g, Rfl: 1  •  hydrALAZINE (APRESOLINE) 50 MG tablet, Take 1 tablet by mouth 3 (Three) Times a Day., Disp: 270 tablet, Rfl: 0  •  isosorbide mononitrate (IMDUR) 30 MG 24 hr tablet, Take 1 tablet by mouth Daily., Disp: 90 tablet, Rfl: 1    Past Medical History:   Diagnosis Date   • Atherosclerosis of native coronary artery of native heart without angina pectoris    • Bruit of right carotid artery    • Heart murmur    • Hyperlipidemia    • Hypertension        Past Surgical History:   Procedure Laterality Date   • CARDIAC CATHETERIZATION  10/09/2013    [facility], Dr. David Harper       Family History   Problem  "Relation Age of Onset   • Heart disease Father    • Heart disease Brother        Social History     Tobacco Use   • Smoking status: Never Smoker   • Smokeless tobacco: Never Used   • Tobacco comment: daily caffeine use   Substance Use Topics   • Alcohol use: No   • Drug use: Defer         ECG 12 Lead  Date/Time: 8/20/2020 8:53 AM  Performed by: Lizzy Wong MD  Authorized by: Lizzy Wong MD   Comparison: compared with previous ECG   Similar to previous ECG  Rhythm: sinus rhythm               Objective:     Visit Vitals  /92   Pulse 59   Ht 182.9 cm (72\")   Wt 81.2 kg (179 lb)   BMI 24.28 kg/m²         Physical Exam   Constitutional: He is oriented to person, place, and time. He appears well-developed and well-nourished.   HENT:   Head: Normocephalic and atraumatic.   Neck: No JVD present. Carotid bruit is not present.   Cardiovascular: Normal rate, regular rhythm, S1 normal and S2 normal. Exam reveals no gallop.   No murmur heard.  Pulses:       Radial pulses are 2+ on the right side, and 2+ on the left side.   Pulmonary/Chest: Effort normal and breath sounds normal.   Abdominal: Soft. Normal appearance.   Musculoskeletal: He exhibits no edema.   Neurological: He is alert and oriented to person, place, and time.   Skin: Skin is warm, dry and intact.   Psychiatric: He has a normal mood and affect.            Assessment:          Diagnosis Plan   1. Atherosclerosis of native coronary artery of native heart without angina pectoris     2. Essential hypertension     3. Hypercholesterolemia            Plan:       1.  Coronary artery disease.  Primary ostial and small vessel disease noted in the past.  He has been medically managed since then with no symptoms.  Continue current medical management.  2.  Hypertension.  Elevated in the office but better controlled when checked at home.  We will continue his current management.  We do have room to titrate his hydralazine and isosorbide if needed.  We are limited " by increasing his carvedilol further due to his relatively low heart rates.  I encouraged him to keep checking his blood pressures a couple times a week and keep a blood pressure log.  3.  Hyperlipidemia.  On atorvastatin with lipids at goal.    We will plan on seeing the patient back again in 1 year or sooner if further issues arise.

## 2020-08-25 DIAGNOSIS — I10 ESSENTIAL HYPERTENSION: ICD-10-CM

## 2020-08-25 RX ORDER — HYDRALAZINE HYDROCHLORIDE 50 MG/1
50 TABLET, FILM COATED ORAL 3 TIMES DAILY
Qty: 270 TABLET | Refills: 0 | Status: SHIPPED | OUTPATIENT
Start: 2020-08-25 | End: 2020-08-27 | Stop reason: SDUPTHER

## 2020-08-25 NOTE — TELEPHONE ENCOUNTER
Caller: Blaine Jimenez    Relationship: Self    Best call back number: [phone]       Medication needed:   Requested Prescriptions     Pending Prescriptions Disp Refills   • hydrALAZINE (APRESOLINE) 50 MG tablet 270 tablet 0     Sig: Take 1 tablet by mouth 3 (Three) Times a Day.         What details did the patient provide when requesting the medication:PATIENT HAS BEEN WITHOUT FOR 3 DYS    Does the patient have less than a 3 day supply:  [x] Yes  [] No    What is the patient's preferred pharmacy:      [address] & ([facility]) - [phone] [facility] [phone]   [phone]

## 2020-08-27 DIAGNOSIS — I25.10 ATHEROSCLEROSIS OF NATIVE CORONARY ARTERY OF NATIVE HEART WITHOUT ANGINA PECTORIS: ICD-10-CM

## 2020-08-27 DIAGNOSIS — I10 ESSENTIAL HYPERTENSION: ICD-10-CM

## 2020-08-27 DIAGNOSIS — E78.00 HYPERCHOLESTEROLEMIA: ICD-10-CM

## 2020-08-27 RX ORDER — CLOPIDOGREL BISULFATE 75 MG/1
75 TABLET ORAL DAILY
Qty: 90 TABLET | Refills: 1 | Status: SHIPPED | OUTPATIENT
Start: 2020-08-27 | End: 2021-03-25

## 2020-08-27 RX ORDER — ATORVASTATIN CALCIUM 40 MG/1
40 TABLET, FILM COATED ORAL DAILY
Qty: 90 TABLET | Refills: 1 | Status: SHIPPED | OUTPATIENT
Start: 2020-08-27 | End: 2021-03-25

## 2020-08-27 RX ORDER — CARVEDILOL 12.5 MG/1
12.5 TABLET ORAL 2 TIMES DAILY
Qty: 180 TABLET | Refills: 1 | Status: SHIPPED | OUTPATIENT
Start: 2020-08-27 | End: 2021-03-25

## 2020-08-27 RX ORDER — ISOSORBIDE MONONITRATE 30 MG/1
30 TABLET, EXTENDED RELEASE ORAL DAILY
Qty: 90 TABLET | Refills: 1 | Status: SHIPPED | OUTPATIENT
Start: 2020-08-27 | End: 2021-03-25

## 2020-08-27 RX ORDER — HYDRALAZINE HYDROCHLORIDE 50 MG/1
50 TABLET, FILM COATED ORAL 3 TIMES DAILY
Qty: 270 TABLET | Refills: 0 | Status: SHIPPED | OUTPATIENT
Start: 2020-08-27 | End: 2021-03-03

## 2020-10-12 DIAGNOSIS — F41.1 GENERALIZED ANXIETY DISORDER: ICD-10-CM

## 2020-10-12 RX ORDER — BUSPIRONE HYDROCHLORIDE 5 MG/1
TABLET ORAL
Qty: 120 TABLET | Refills: 1 | Status: SHIPPED | OUTPATIENT
Start: 2020-10-12 | End: 2021-02-05

## 2020-12-23 DIAGNOSIS — I10 ESSENTIAL HYPERTENSION: ICD-10-CM

## 2020-12-23 RX ORDER — AMLODIPINE BESYLATE 10 MG/1
TABLET ORAL
Qty: 90 TABLET | Refills: 0 | Status: SHIPPED | OUTPATIENT
Start: 2020-12-23 | End: 2021-03-25

## 2021-02-03 ENCOUNTER — OFFICE VISIT (OUTPATIENT)
Dept: INTERNAL MEDICINE | Facility: CLINIC | Age: 66
End: 2021-02-03

## 2021-02-03 ENCOUNTER — TELEPHONE (OUTPATIENT)
Dept: GASTROENTEROLOGY | Facility: CLINIC | Age: 66
End: 2021-02-03

## 2021-02-03 VITALS
DIASTOLIC BLOOD PRESSURE: 76 MMHG | HEIGHT: 72 IN | SYSTOLIC BLOOD PRESSURE: 144 MMHG | BODY MASS INDEX: 25.22 KG/M2 | WEIGHT: 186.2 LBS | TEMPERATURE: 97.3 F | OXYGEN SATURATION: 99 % | HEART RATE: 66 BPM

## 2021-02-03 DIAGNOSIS — I25.10 ATHEROSCLEROSIS OF NATIVE CORONARY ARTERY OF NATIVE HEART WITHOUT ANGINA PECTORIS: ICD-10-CM

## 2021-02-03 DIAGNOSIS — I10 ESSENTIAL HYPERTENSION: ICD-10-CM

## 2021-02-03 DIAGNOSIS — Z00.00 HEALTH CARE MAINTENANCE: ICD-10-CM

## 2021-02-03 DIAGNOSIS — F41.1 GENERALIZED ANXIETY DISORDER: ICD-10-CM

## 2021-02-03 DIAGNOSIS — E78.00 HYPERCHOLESTEROLEMIA: ICD-10-CM

## 2021-02-03 DIAGNOSIS — Z12.11 COLON CANCER SCREENING: ICD-10-CM

## 2021-02-03 DIAGNOSIS — Z00.00 MEDICARE ANNUAL WELLNESS VISIT, SUBSEQUENT: Primary | ICD-10-CM

## 2021-02-03 DIAGNOSIS — R35.1 NOCTURIA: ICD-10-CM

## 2021-02-03 DIAGNOSIS — I65.23 BILATERAL CAROTID ARTERY STENOSIS: Chronic | ICD-10-CM

## 2021-02-03 PROBLEM — R09.89 BRUIT OF RIGHT CAROTID ARTERY: Status: RESOLVED | Noted: 2018-06-20 | Resolved: 2021-02-03

## 2021-02-03 LAB
ALBUMIN SERPL-MCNC: 4.4 G/DL (ref 3.5–5.2)
ALBUMIN/GLOB SERPL: 2 G/DL
ALP SERPL-CCNC: 99 U/L (ref 39–117)
ALT SERPL-CCNC: 22 U/L (ref 1–41)
AST SERPL-CCNC: 23 U/L (ref 1–40)
BILIRUB SERPL-MCNC: 0.3 MG/DL (ref 0–1.2)
BUN SERPL-MCNC: 18 MG/DL (ref 8–23)
BUN/CREAT SERPL: 22.8 (ref 7–25)
CALCIUM SERPL-MCNC: 9 MG/DL (ref 8.6–10.5)
CHLORIDE SERPL-SCNC: 104 MMOL/L (ref 98–107)
CHOLEST SERPL-MCNC: 134 MG/DL (ref 0–200)
CO2 SERPL-SCNC: 25.5 MMOL/L (ref 22–29)
CREAT SERPL-MCNC: 0.79 MG/DL (ref 0.76–1.27)
ERYTHROCYTE [DISTWIDTH] IN BLOOD BY AUTOMATED COUNT: 12.1 % (ref 12.3–15.4)
GLOBULIN SER CALC-MCNC: 2.2 GM/DL
GLUCOSE SERPL-MCNC: 101 MG/DL (ref 65–99)
HCT VFR BLD AUTO: 42.5 % (ref 37.5–51)
HDLC SERPL-MCNC: 34 MG/DL (ref 40–60)
HGB BLD-MCNC: 13.7 G/DL (ref 13–17.7)
LDLC SERPL CALC-MCNC: 83 MG/DL (ref 0–100)
MCH RBC QN AUTO: 29.5 PG (ref 26.6–33)
MCHC RBC AUTO-ENTMCNC: 32.2 G/DL (ref 31.5–35.7)
MCV RBC AUTO: 91.6 FL (ref 79–97)
PLATELET # BLD AUTO: 258 10*3/MM3 (ref 140–450)
POTASSIUM SERPL-SCNC: 4.4 MMOL/L (ref 3.5–5.2)
PROT SERPL-MCNC: 6.6 G/DL (ref 6–8.5)
PSA SERPL-MCNC: 0.53 NG/ML (ref 0–4)
RBC # BLD AUTO: 4.64 10*6/MM3 (ref 4.14–5.8)
SODIUM SERPL-SCNC: 137 MMOL/L (ref 136–145)
TRIGL SERPL-MCNC: 89 MG/DL (ref 0–150)
TSH SERPL DL<=0.005 MIU/L-ACNC: 1.76 UIU/ML (ref 0.27–4.2)
VLDLC SERPL CALC-MCNC: 17 MG/DL (ref 5–40)
WBC # BLD AUTO: 5.52 10*3/MM3 (ref 3.4–10.8)

## 2021-02-03 PROCEDURE — G0402 INITIAL PREVENTIVE EXAM: HCPCS | Performed by: NURSE PRACTITIONER

## 2021-02-03 PROCEDURE — 99214 OFFICE O/P EST MOD 30 MIN: CPT | Performed by: NURSE PRACTITIONER

## 2021-02-03 NOTE — PROGRESS NOTES
The ABCs of the Annual Wellness Visit  Subsequent Medicare Wellness Visit    Chief Complaint   Patient presents with   • Medicare Wellness-subsequent       Subjective   History of Present Illness:  Blaine Jimenez is a 65 y.o. male who presents for a Subsequent Medicare Wellness Visit and follow-up on chronic conditions.    He has hyperlipidemia treated with atorvastatin 40 mg daily reporting good compliance with this medication.    He has hypertension treated with amlodipine 10 mg daily, Imdur 30 mg daily, hydralazine 50 mg 3 times daily and Coreg 12.5 mg twice daily.  Resistant hypertension has been an ongoing issue for him and he also follows with cardiology, Dr. Wong for this.  Blood pressure today is lower than previous office readings, 144/76.  He reports that he is seeing readings in this range at home.    He has generalized anxiety disorder and takes buspirone 5 mg twice daily.  No SI or HI and he reports that this is working well for him.    He has coronary artery disease and follows with Dr. Wong, cardiology.  He takes Plavix daily.    He has bilateral carotid artery stenosis and follows with Dr. Ruelas, vascular surgery.  He is due for vascular follow-up but states that this got canceled last year due to the pandemic.  He plans to call and reschedule.    He is due for screening colonoscopy.  He has not had a flu shot this season.    Denies development of any other new issues today.    HEALTH RISK ASSESSMENT    Recent Hospitalizations:  No hospitalization(s) within the last year.    Current Medical Providers:  Patient Care Team:  Elysia New APRN as PCP - General (Nurse Practitioner)  Lizzy Wong MD as Consulting Physician (Cardiology)  Jaswinder Ruelas DO (Vascular Surgery)    Smoking Status:  Social History     Tobacco Use   Smoking Status Never Smoker   Smokeless Tobacco Never Used   Tobacco Comment    daily caffeine use       Alcohol Consumption:  Social History     Substance and Sexual  Activity   Alcohol Use No       Depression Screen:   PHQ-2/PHQ-9 Depression Screening 2/3/2021   Little interest or pleasure in doing things 0   Feeling down, depressed, or hopeless 0   Trouble falling or staying asleep, or sleeping too much 0   Feeling tired or having little energy 0   Poor appetite or overeating 0   Feeling bad about yourself - or that you are a failure or have let yourself or your family down 0   Trouble concentrating on things, such as reading the newspaper or watching television 0   Moving or speaking so slowly that other people could have noticed. Or the opposite - being so fidgety or restless that you have been moving around a lot more than usual 0   Thoughts that you would be better off dead, or of hurting yourself in some way 0   Total Score 0   If you checked off any problems, how difficult have these problems made it for you to do your work, take care of things at home, or get along with other people? Not difficult at all       Fall Risk Screen:  WIN Fall Risk Assessment was completed, and patient is at LOW risk for falls.Assessment completed on:2/3/2021    Health Habits and Functional and Cognitive Screening:  Functional & Cognitive Status 2/3/2021   Do you have difficulty preparing food and eating? No   Do you have difficulty bathing yourself, getting dressed or grooming yourself? No   Do you have difficulty using the toilet? No   Do you have difficulty moving around from place to place? No   Do you have trouble with steps or getting out of a bed or a chair? No   Current Diet Well Balanced Diet   Dental Exam Not up to date   Eye Exam Not up to date   Exercise (times per week) 7 times per week   Current Exercises Include No Regular Exercise   Do you need help using the phone?  No   Are you deaf or do you have serious difficulty hearing?  No   Do you need help with transportation? No   Do you need help shopping? No   Do you need help preparing meals?  No   Do you need help with  housework?  No   Do you need help with laundry? No   Do you need help taking your medications? No   Do you need help managing money? No   Do you ever drive or ride in a car without wearing a seat belt? No   Have you felt unusual stress, anger or loneliness in the last month? No   Who do you live with? Child   If you need help, do you have trouble finding someone available to you? No   Have you been bothered in the last four weeks by sexual problems? No   Do you have difficulty concentrating, remembering or making decisions? No         Does the patient have evidence of cognitive impairment? No    Asprin use counseling:Taking ASA appropriately as indicated    Age-appropriate Screening Schedule:  Refer to the list below for future screening recommendations based on patient's age, sex and/or medical conditions. Orders for these recommended tests are listed in the plan section. The patient has been provided with a written plan.    Health Maintenance   Topic Date Due   • ZOSTER VACCINE (1 of 2) 05/25/2004   • INFLUENZA VACCINE  02/03/2022 (Originally 8/1/2020)   • LIPID PANEL  08/03/2021   • COLONOSCOPY  06/20/2028   • TDAP/TD VACCINES (2 - Td) 08/08/2028          The following portions of the patient's history were reviewed and updated as appropriate: allergies, current medications, past family history, past medical history, past social history, past surgical history and problem list.    Outpatient Medications Prior to Visit   Medication Sig Dispense Refill   • amLODIPine (NORVASC) 10 MG tablet TAKE ONE TABLET BY MOUTH DAILY 90 tablet 0   • aspirin 81 MG chewable tablet Chew.     • atorvastatin (LIPITOR) 40 MG tablet Take 1 tablet by mouth Daily. 90 tablet 1   • busPIRone (BUSPAR) 5 MG tablet TAKE ONE TABLET BY MOUTH TWICE A  tablet 1   • carvedilol (COREG) 12.5 MG tablet Take 1 tablet by mouth 2 (Two) Times a Day. 180 tablet 1   • clopidogrel (PLAVIX) 75 MG tablet Take 1 tablet by mouth Daily. 90 tablet 1   •  diclofenac (VOLTAREN) 1 % gel gel Apply 4 g topically to the appropriate area as directed 3 (Three) Times a Day As Needed (joint or back pain). 100 g 1   • hydrALAZINE (APRESOLINE) 50 MG tablet Take 1 tablet by mouth 3 (Three) Times a Day. 270 tablet 0   • isosorbide mononitrate (IMDUR) 30 MG 24 hr tablet Take 1 tablet by mouth Daily. 90 tablet 1     No facility-administered medications prior to visit.        Patient Active Problem List   Diagnosis   • Spasm of back muscles   • Atherosclerosis of coronary artery   • Generalized anxiety disorder   • Hypercholesterolemia   • Hypertension   • Controlled substance agreement signed   • Health care maintenance   • Bruit of right carotid artery   • Heart murmur   • Bilateral carotid artery stenosis       Advanced Care Planning:  ACP discussion was held with the patient during this visit. Patient does not have an advance directive, information provided.    Review of Systems   Constitutional: Negative for activity change, chills, fatigue, fever and unexpected weight change.   HENT: Negative for congestion, hearing loss, postnasal drip, sinus pressure, sinus pain, sneezing, sore throat and tinnitus.    Eyes: Negative for photophobia, pain and visual disturbance.   Respiratory: Negative for cough, chest tightness, shortness of breath and wheezing.    Cardiovascular: Negative for chest pain, palpitations and leg swelling.   Gastrointestinal: Negative for abdominal distention, abdominal pain, constipation, diarrhea, nausea and vomiting.   Endocrine: Negative for polydipsia, polyphagia and polyuria.   Genitourinary: Negative for dysuria, frequency, hematuria and urgency.   Neurological: Negative for dizziness, weakness, numbness and headaches.   All other systems reviewed and are negative.      Compared to one year ago, the patient feels his physical health is the same.  Compared to one year ago, the patient feels his mental health is the same.    Reviewed chart for potential of  "high risk medication in the elderly: yes  Reviewed chart for potential of harmful drug interactions in the elderly:yes    Objective         Vitals:    02/03/21 0857   BP: 144/76   BP Location: Left arm   Patient Position: Sitting   Cuff Size: Adult   Pulse: 66   Temp: 97.3 °F (36.3 °C)   SpO2: 99%   Weight: 84.5 kg (186 lb 3.2 oz)   Height: 182.9 cm (72\")       Body mass index is 25.25 kg/m².  Discussed the patient's BMI with him. The BMI is above average; no BMI management plan is appropriate..    Physical Exam  Vitals signs and nursing note reviewed.   Constitutional:       General: He is not in acute distress.     Appearance: Normal appearance. He is well-developed. He is not ill-appearing, toxic-appearing or diaphoretic.   HENT:      Head: Normocephalic and atraumatic.      Right Ear: Tympanic membrane, ear canal and external ear normal.      Left Ear: Tympanic membrane, ear canal and external ear normal.   Eyes:      Pupils: Pupils are equal, round, and reactive to light.   Neck:      Musculoskeletal: Normal range of motion and neck supple. No neck rigidity or muscular tenderness.      Vascular: No carotid bruit.   Cardiovascular:      Rate and Rhythm: Normal rate and regular rhythm.      Pulses: Normal pulses.      Heart sounds: Normal heart sounds.      Comments: No peripheral edema  Pulmonary:      Effort: Pulmonary effort is normal. No respiratory distress.      Breath sounds: Normal breath sounds. No stridor. No wheezing, rhonchi or rales.   Chest:      Chest wall: No tenderness.   Abdominal:      General: Bowel sounds are normal. There is no distension.      Palpations: Abdomen is soft. There is no mass.      Tenderness: There is no abdominal tenderness. There is no right CVA tenderness, left CVA tenderness, guarding or rebound.      Hernia: No hernia is present.   Musculoskeletal: Normal range of motion.   Lymphadenopathy:      Cervical: No cervical adenopathy.   Skin:     General: Skin is warm and dry. "      Capillary Refill: Capillary refill takes less than 2 seconds.   Neurological:      General: No focal deficit present.      Mental Status: He is alert and oriented to person, place, and time. Mental status is at baseline.   Psychiatric:         Mood and Affect: Mood normal.         Behavior: Behavior normal.         Thought Content: Thought content normal.         Judgment: Judgment normal.               Assessment/Plan   Medicare Risks and Personalized Health Plan  CMS Preventative Services Quick Reference  Advance Directive Discussion  Breast Cancer/Mammogram Screening  Cardiovascular risk  Chronic Pain   Dementia/Memory   Depression/Dysphoria  Fall Risk  Immunizations Discussed/Encouraged (specific immunizations; Influenza )  Inactivity/Sedentary    The above risks/problems have been discussed with the patient.  Pertinent information has been shared with the patient in the After Visit Summary.  Follow up plans and orders are seen below in the Assessment/Plan Section.    Diagnoses and all orders for this visit:    1. Medicare annual wellness visit, subsequent (Primary)    2. Hypercholesterolemia  Assessment & Plan:  Continue atorvastatin 40 mg daily.  Lipid panel today and we will adjust therapy if needed.  Discussed dietary modifications including lowering fats in the diet.    Orders:  -     Lipid panel    3. Health care maintenance  Assessment & Plan:  Due for screening colonoscopy which I reordered.  He will pursue his flu shot at the pharmacy.  Routine testicular self exams recommended.  Recommended goal of exercising 30 minutes/day at least 5 days/week.  Recommended routine optometry and dental exams.    Orders:  -     Ambulatory Referral For Screening Colonoscopy    4. Generalized anxiety disorder  Assessment & Plan:  Stable with buspirone 5 mg twice daily, continue current therapy.      5. Atherosclerosis of native coronary artery of native heart without angina pectoris  Assessment & Plan:  No symptoms  at this time.  He is encouraged to follow routinely with his cardiologist.  Continue Plavix.      6. Essential hypertension  Assessment & Plan:  Stable at this time, continue amlodipine, Imdur, hydralazine and Coreg.  Routine home monitoring recommended for goal of less than 130/80.  Discussed lowering dietary sodium intake.    Orders:  -     CBC No Differential  -     Comprehensive metabolic panel  -     TSH Rfx On Abnormal To Free T4    7. Nocturia  Comments:  Stable, checking PSA today.  Orders:  -     PSA DIAGNOSTIC ONLY    8. Bilateral carotid artery stenosis  Assessment & Plan:  He is encouraged to make his follow-up with vascular as he is due for vascular rescreened of the bilateral stenosis.  He will continue Plavix.      9. Colon cancer screening  -     Ambulatory Referral For Screening Colonoscopy    Follow Up:  Return in about 6 months (around 8/3/2021).     An After Visit Summary and PPPS were given to the patient.     We will contact patient with his lab results and any further recommendations.  Follow-up as needed and I will see him back in 6 months for recheck of chronic conditions/routine health maintenance.

## 2021-02-03 NOTE — PATIENT INSTRUCTIONS
Please call for a follow up with the cardiologist, Dr. Wong around 2021.   Please call for a follow up with your vascular specialist, Dr. Ruelas.         Medicare Wellness  Personal Prevention Plan of Service     Date of Office Visit:  2021  Encounter Provider:  AILYN Estrella  Place of Service:  [facility] INTERNAL MEDICINE  Patient Name: Blaine Jimenez  :  1955    As part of the Medicare Wellness portion of your visit today, we are providing you with this personalized preventive plan of services (PPPS). This plan is based upon recommendations of the [place] Preventive Services Task Force (USPSTF) and the Advisory Committee on Immunization Practices (ACIP).    This lists the preventive care services that should be considered, and provides dates of when you are due. Items listed as completed are up-to-date and do not require any further intervention.    Health Maintenance   Topic Date Due   • ZOSTER VACCINE (1 of 2) 2004   • INFLUENZA VACCINE  2022 (Originally 2020)   • LIPID PANEL  2021   • ANNUAL WELLNESS VISIT  2022   • COLONOSCOPY  2028   • TDAP/TD VACCINES (2 - Td) 2028   • HEPATITIS C SCREENING  Completed   • Pneumococcal Vaccine 65+  Completed   • MENINGOCOCCAL VACCINE  Aged Out       No orders of the defined types were placed in this encounter.      No follow-ups on file.

## 2021-02-03 NOTE — ASSESSMENT & PLAN NOTE
Stable at this time, continue amlodipine, Imdur, hydralazine and Coreg.  Routine home monitoring recommended for goal of less than 130/80.  Discussed lowering dietary sodium intake.

## 2021-02-03 NOTE — ASSESSMENT & PLAN NOTE
He is encouraged to make his follow-up with vascular as he is due for vascular rescreened of the bilateral stenosis.  He will continue Plavix.

## 2021-02-03 NOTE — ASSESSMENT & PLAN NOTE
No symptoms at this time.  He is encouraged to follow routinely with his cardiologist.  Continue Plavix.

## 2021-02-03 NOTE — ASSESSMENT & PLAN NOTE
Due for screening colonoscopy which I reordered.  He will pursue his flu shot at the pharmacy.  Routine testicular self exams recommended.  Recommended goal of exercising 30 minutes/day at least 5 days/week.  Recommended routine optometry and dental exams.

## 2021-02-03 NOTE — ASSESSMENT & PLAN NOTE
Continue atorvastatin 40 mg daily.  Lipid panel today and we will adjust therapy if needed.  Discussed dietary modifications including lowering fats in the diet.

## 2021-02-04 NOTE — PROGRESS NOTES
Good afternoon Mr. Jimenez, your labs are back and looking stable.  Blood count looks great, no anemia and normal white blood cells.  Metabolic panel is stable including kidney, liver function and electrolytes.  Cholesterol panel looks great.  Thyroid numbers are normal.  PSA is normal.  Let me know if you have any questions or concerns and we will recheck routinely.  Have a great day,    AILYN Estrella

## 2021-02-05 DIAGNOSIS — F41.1 GENERALIZED ANXIETY DISORDER: ICD-10-CM

## 2021-02-05 RX ORDER — BUSPIRONE HYDROCHLORIDE 5 MG/1
TABLET ORAL
Qty: 180 TABLET | Refills: 1 | Status: SHIPPED | OUTPATIENT
Start: 2021-02-05 | End: 2021-08-04

## 2021-02-10 ENCOUNTER — TELEPHONE (OUTPATIENT)
Dept: GASTROENTEROLOGY | Facility: CLINIC | Age: 66
End: 2021-02-10

## 2021-03-02 DIAGNOSIS — I10 ESSENTIAL HYPERTENSION: ICD-10-CM

## 2021-03-03 RX ORDER — HYDRALAZINE HYDROCHLORIDE 50 MG/1
TABLET, FILM COATED ORAL
Qty: 270 TABLET | Refills: 0 | Status: SHIPPED | OUTPATIENT
Start: 2021-03-03 | End: 2021-06-07

## 2021-03-16 ENCOUNTER — BULK ORDERING (OUTPATIENT)
Dept: CASE MANAGEMENT | Facility: OTHER | Age: 66
End: 2021-03-16

## 2021-03-16 DIAGNOSIS — Z23 IMMUNIZATION DUE: ICD-10-CM

## 2021-03-17 ENCOUNTER — IMMUNIZATION (OUTPATIENT)
Dept: VACCINE CLINIC | Facility: HOSPITAL | Age: 66
End: 2021-03-17

## 2021-03-17 DIAGNOSIS — Z23 IMMUNIZATION DUE: ICD-10-CM

## 2021-03-17 PROCEDURE — 91300 HC SARSCOV02 VAC 30MCG/0.3ML IM: CPT | Performed by: INTERNAL MEDICINE

## 2021-03-17 PROCEDURE — 0001A: CPT | Performed by: INTERNAL MEDICINE

## 2021-03-24 DIAGNOSIS — E78.00 HYPERCHOLESTEROLEMIA: ICD-10-CM

## 2021-03-24 DIAGNOSIS — I10 ESSENTIAL HYPERTENSION: ICD-10-CM

## 2021-03-24 DIAGNOSIS — I25.10 ATHEROSCLEROSIS OF NATIVE CORONARY ARTERY OF NATIVE HEART WITHOUT ANGINA PECTORIS: ICD-10-CM

## 2021-03-25 RX ORDER — CLOPIDOGREL BISULFATE 75 MG/1
TABLET ORAL
Qty: 90 TABLET | Refills: 0 | Status: SHIPPED | OUTPATIENT
Start: 2021-03-25 | End: 2021-06-25

## 2021-03-25 RX ORDER — AMLODIPINE BESYLATE 10 MG/1
TABLET ORAL
Qty: 90 TABLET | Refills: 0 | Status: SHIPPED | OUTPATIENT
Start: 2021-03-25 | End: 2021-06-25

## 2021-03-25 RX ORDER — ISOSORBIDE MONONITRATE 30 MG/1
TABLET, EXTENDED RELEASE ORAL
Qty: 90 TABLET | Refills: 0 | Status: SHIPPED | OUTPATIENT
Start: 2021-03-25 | End: 2021-06-25

## 2021-03-25 RX ORDER — ATORVASTATIN CALCIUM 40 MG/1
TABLET, FILM COATED ORAL
Qty: 90 TABLET | Refills: 0 | Status: SHIPPED | OUTPATIENT
Start: 2021-03-25 | End: 2021-06-25

## 2021-03-25 RX ORDER — CARVEDILOL 12.5 MG/1
TABLET ORAL
Qty: 180 TABLET | Refills: 0 | Status: SHIPPED | OUTPATIENT
Start: 2021-03-25 | End: 2021-06-25

## 2021-04-07 ENCOUNTER — IMMUNIZATION (OUTPATIENT)
Dept: VACCINE CLINIC | Facility: HOSPITAL | Age: 66
End: 2021-04-07

## 2021-04-07 PROCEDURE — 91300 HC SARSCOV02 VAC 30MCG/0.3ML IM: CPT | Performed by: INTERNAL MEDICINE

## 2021-04-07 PROCEDURE — 0002A: CPT | Performed by: INTERNAL MEDICINE

## 2021-06-06 DIAGNOSIS — I10 ESSENTIAL HYPERTENSION: ICD-10-CM

## 2021-06-07 RX ORDER — HYDRALAZINE HYDROCHLORIDE 50 MG/1
TABLET, FILM COATED ORAL
Qty: 270 TABLET | Refills: 1 | Status: SHIPPED | OUTPATIENT
Start: 2021-06-07 | End: 2021-12-23

## 2021-06-25 DIAGNOSIS — E78.00 HYPERCHOLESTEROLEMIA: ICD-10-CM

## 2021-06-25 DIAGNOSIS — I25.10 ATHEROSCLEROSIS OF NATIVE CORONARY ARTERY OF NATIVE HEART WITHOUT ANGINA PECTORIS: ICD-10-CM

## 2021-06-25 DIAGNOSIS — I10 ESSENTIAL HYPERTENSION: ICD-10-CM

## 2021-06-25 RX ORDER — ISOSORBIDE MONONITRATE 30 MG/1
TABLET, EXTENDED RELEASE ORAL
Qty: 90 TABLET | Refills: 1 | Status: SHIPPED | OUTPATIENT
Start: 2021-06-25 | End: 2021-12-27

## 2021-06-25 RX ORDER — CARVEDILOL 12.5 MG/1
TABLET ORAL
Qty: 180 TABLET | Refills: 1 | Status: SHIPPED | OUTPATIENT
Start: 2021-06-25 | End: 2021-12-27

## 2021-06-25 RX ORDER — CLOPIDOGREL BISULFATE 75 MG/1
TABLET ORAL
Qty: 90 TABLET | Refills: 1 | Status: SHIPPED | OUTPATIENT
Start: 2021-06-25 | End: 2021-12-23

## 2021-06-25 RX ORDER — ATORVASTATIN CALCIUM 40 MG/1
TABLET, FILM COATED ORAL
Qty: 90 TABLET | Refills: 1 | Status: SHIPPED | OUTPATIENT
Start: 2021-06-25 | End: 2021-12-27

## 2021-06-25 RX ORDER — AMLODIPINE BESYLATE 10 MG/1
TABLET ORAL
Qty: 90 TABLET | Refills: 1 | Status: SHIPPED | OUTPATIENT
Start: 2021-06-25 | End: 2021-12-27

## 2021-08-04 ENCOUNTER — OFFICE VISIT (OUTPATIENT)
Dept: INTERNAL MEDICINE | Facility: CLINIC | Age: 66
End: 2021-08-04

## 2021-08-04 VITALS
WEIGHT: 178 LBS | RESPIRATION RATE: 18 BRPM | OXYGEN SATURATION: 96 % | DIASTOLIC BLOOD PRESSURE: 70 MMHG | SYSTOLIC BLOOD PRESSURE: 142 MMHG | TEMPERATURE: 98.4 F | BODY MASS INDEX: 24.11 KG/M2 | HEIGHT: 72 IN | HEART RATE: 65 BPM

## 2021-08-04 DIAGNOSIS — I25.10 ATHEROSCLEROSIS OF NATIVE CORONARY ARTERY OF NATIVE HEART WITHOUT ANGINA PECTORIS: Chronic | ICD-10-CM

## 2021-08-04 DIAGNOSIS — Z00.00 HEALTH CARE MAINTENANCE: Chronic | ICD-10-CM

## 2021-08-04 DIAGNOSIS — E78.00 HYPERCHOLESTEROLEMIA: Primary | Chronic | ICD-10-CM

## 2021-08-04 DIAGNOSIS — I10 ESSENTIAL HYPERTENSION: Chronic | ICD-10-CM

## 2021-08-04 DIAGNOSIS — I65.23 BILATERAL CAROTID ARTERY STENOSIS: Chronic | ICD-10-CM

## 2021-08-04 DIAGNOSIS — F41.1 GENERALIZED ANXIETY DISORDER: Chronic | ICD-10-CM

## 2021-08-04 LAB
ALBUMIN SERPL-MCNC: 4.5 G/DL (ref 3.5–5.2)
ALBUMIN/GLOB SERPL: 1.8 G/DL
ALP SERPL-CCNC: 101 U/L (ref 39–117)
ALT SERPL-CCNC: 21 U/L (ref 1–41)
AST SERPL-CCNC: 22 U/L (ref 1–40)
BILIRUB SERPL-MCNC: 0.5 MG/DL (ref 0–1.2)
BUN SERPL-MCNC: 16 MG/DL (ref 8–23)
BUN/CREAT SERPL: 18.4 (ref 7–25)
CALCIUM SERPL-MCNC: 9.6 MG/DL (ref 8.6–10.5)
CHLORIDE SERPL-SCNC: 103 MMOL/L (ref 98–107)
CHOLEST SERPL-MCNC: 155 MG/DL (ref 0–200)
CO2 SERPL-SCNC: 27.1 MMOL/L (ref 22–29)
CREAT SERPL-MCNC: 0.87 MG/DL (ref 0.76–1.27)
ERYTHROCYTE [DISTWIDTH] IN BLOOD BY AUTOMATED COUNT: 12.5 % (ref 12.3–15.4)
GLOBULIN SER CALC-MCNC: 2.5 GM/DL
GLUCOSE SERPL-MCNC: 104 MG/DL (ref 65–99)
HCT VFR BLD AUTO: 42.6 % (ref 37.5–51)
HDLC SERPL-MCNC: 39 MG/DL (ref 40–60)
HGB BLD-MCNC: 14.2 G/DL (ref 13–17.7)
LDLC SERPL CALC-MCNC: 94 MG/DL (ref 0–100)
MCH RBC QN AUTO: 30 PG (ref 26.6–33)
MCHC RBC AUTO-ENTMCNC: 33.3 G/DL (ref 31.5–35.7)
MCV RBC AUTO: 90.1 FL (ref 79–97)
PLATELET # BLD AUTO: 264 10*3/MM3 (ref 140–450)
POTASSIUM SERPL-SCNC: 4.6 MMOL/L (ref 3.5–5.2)
PROT SERPL-MCNC: 7 G/DL (ref 6–8.5)
RBC # BLD AUTO: 4.73 10*6/MM3 (ref 4.14–5.8)
SODIUM SERPL-SCNC: 141 MMOL/L (ref 136–145)
TRIGL SERPL-MCNC: 119 MG/DL (ref 0–150)
TSH SERPL DL<=0.005 MIU/L-ACNC: 1.73 UIU/ML (ref 0.27–4.2)
VLDLC SERPL CALC-MCNC: 22 MG/DL (ref 5–40)
WBC # BLD AUTO: 6.33 10*3/MM3 (ref 3.4–10.8)

## 2021-08-04 PROCEDURE — 99214 OFFICE O/P EST MOD 30 MIN: CPT | Performed by: NURSE PRACTITIONER

## 2021-08-04 NOTE — ASSESSMENT & PLAN NOTE
Routine dental, vision exams are recommended.    He is encouraged to schedule a screening colonoscopy which I ordered in February 2021.  He will be given the contact number to get this scheduled.    Well-balanced diet and regular exercise are recommended.    PSA last checked in February 2021 (WNL), recheck in 6 months.

## 2021-08-04 NOTE — ASSESSMENT & PLAN NOTE
No new cardiac symptoms.  Continue baby aspirin daily.  Discussed the importance of good cholesterol and blood pressure control in the setting of CAD.  He is encouraged to make his follow-up with Dr. Wong, his cardiologist.

## 2021-08-04 NOTE — PATIENT INSTRUCTIONS
Please make sure to schedule a cardiology follow-up with Dr. Wong.     Please schedule your colonoscopy.

## 2021-08-04 NOTE — PROGRESS NOTES
"Chief Complaint  Hypertension (Pt presents here today for a 6 month follow up.) and Hyperlipidemia    Subjective          Blaine Jimenez presents to [facility]  Patient presents for 6-month follow-up.  This is a 66-year-old male.    He has CAD and follows with Dr. Wong, cardiology.  Due for upcoming 1 year follow-up.  He takes a baby aspirin daily.  Denies any new cardiac symptoms.    He has hyperlipidemia and takes atorvastatin 40 mg daily endorsing good compliance with this medication.    He has hypertension.  He checks his blood pressures periodically at home.  He is aware of goal, less than 130/80.  He takes Imdur 30 mg daily, Coreg 12.5 mg twice daily, amlodipine 10 mg daily and hydralazine 50 mg 3 times daily with good compliance.    He has RITA and takes BuSpar 5 mg twice daily.  No SI or HI and he reports that this is working well.    He has a history of carotid stenosis and takes Plavix with good compliance.  Denies any abnormal bleeding.    Overall reports that he is feeling well and denies development of any other new issues today.    Hypertension  This is a chronic problem. The current episode started more than 1 year ago. The problem is unchanged. The problem is resistant. Pertinent negatives include no anxiety, blurred vision, chest pain, headaches, malaise/fatigue, neck pain, orthopnea, palpitations, peripheral edema, PND, shortness of breath or sweats. Risk factors for coronary artery disease include family history and stress. There are no compliance problems.        Objective   Vital Signs:   /70 Comment: Manual recheck by NP  Pulse 65   Temp 98.4 °F (36.9 °C)   Resp 18   Ht 182.9 cm (72\")   Wt 80.7 kg (178 lb)   SpO2 96%   BMI 24.14 kg/m²     Physical Exam  Vitals and nursing note reviewed.   Constitutional:       General: He is not in acute distress.     Appearance: Normal appearance. He is well-developed. He is not ill-appearing, toxic-appearing or " diaphoretic.   HENT:      Head: Normocephalic and atraumatic.      Right Ear: Tympanic membrane, ear canal and external ear normal.      Left Ear: Tympanic membrane, ear canal and external ear normal.   Eyes:      Pupils: Pupils are equal, round, and reactive to light.   Neck:      Vascular: No carotid bruit.   Cardiovascular:      Rate and Rhythm: Normal rate and regular rhythm.      Pulses: Normal pulses.      Heart sounds: Normal heart sounds.      Comments: No peripheral edema.  Pulmonary:      Effort: Pulmonary effort is normal. No respiratory distress.      Breath sounds: Normal breath sounds. No stridor. No wheezing, rhonchi or rales.   Chest:      Chest wall: No tenderness.   Abdominal:      General: Bowel sounds are normal. There is no distension.      Palpations: Abdomen is soft. There is no mass.      Tenderness: There is no abdominal tenderness. There is no right CVA tenderness, left CVA tenderness, guarding or rebound.      Hernia: No hernia is present.   Musculoskeletal:         General: Normal range of motion.      Cervical back: Normal range of motion and neck supple. No rigidity or tenderness.   Lymphadenopathy:      Cervical: No cervical adenopathy.   Skin:     General: Skin is warm and dry.      Capillary Refill: Capillary refill takes less than 2 seconds.   Neurological:      General: No focal deficit present.      Mental Status: He is alert and oriented to person, place, and time. Mental status is at baseline.   Psychiatric:         Mood and Affect: Mood normal.         Behavior: Behavior normal.         Thought Content: Thought content normal.         Judgment: Judgment normal.        Result Review :   The following data was reviewed by: AILYN Corral on 08/04/2021:  Common labs    Common Labsle 2/3/21 2/3/21 2/3/21 2/3/21    0931 0931 0931 0931   Glucose  101 (A)     BUN  18     Creatinine  0.79     eGFR Non  Am  98     eGFR African Am  119     Sodium  137     Potassium  4.4      Chloride  104     Calcium  9.0     Total Protein  6.6     Albumin  4.40     Total Bilirubin  0.3     Alkaline Phosphatase  99     AST (SGOT)  23     ALT (SGPT)  22     WBC 5.52      Hemoglobin 13.7      Hematocrit 42.5      Platelets 258      Total Cholesterol   134    Triglycerides   89    HDL Cholesterol   34 (A)    LDL Cholesterol    83    PSA    0.525   (A) Abnormal value       Comments are available for some flowsheets but are not being displayed.           Office Visit with Elysia New APRN (02/03/2021)    Current outpatient and discharge medications have been reconciled for the patient.  Reviewed by: AILYN Corral           Assessment and Plan    Diagnoses and all orders for this visit:    1. Hypercholesterolemia (Primary)  Assessment & Plan:  Continue atorvastatin and well-balanced diet.  Lipid panel today and we will adjust therapy if needed.    Orders:  -     Lipid panel    2. Atherosclerosis of native coronary artery of native heart without angina pectoris  Assessment & Plan:  No new cardiac symptoms.  Continue baby aspirin daily.  Discussed the importance of good cholesterol and blood pressure control in the setting of CAD.  He is encouraged to make his follow-up with Dr. Wong, his cardiologist.      3. Essential hypertension  Assessment & Plan:  Hypertension is stable, I have asked him to monitor home readings regularly, goal of less than 130/80 along with DASH diet.  Continue Imdur, hydralazine, Coreg and amlodipine.    Orders:  -     CBC No Differential  -     Comprehensive metabolic panel  -     TSH Rfx On Abnormal To Free T4    4. Health care maintenance  Assessment & Plan:  Routine dental, vision exams are recommended.    He is encouraged to schedule a screening colonoscopy which I ordered in February 2021.  He will be given the contact number to get this scheduled.    Well-balanced diet and regular exercise are recommended.    PSA last checked in February 2021 (WNL), recheck in 6  months.      5. Bilateral carotid artery stenosis  Assessment & Plan:  Continue Plavix, routine vascular follow-ups.      6. Generalized anxiety disorder  Assessment & Plan:  Stable with BuSpar, continue current therapy.        We will contact patient with lab results and any further recommendations.  Follow-up as needed and I will see him back in 6 months for a Medicare wellness visit.    Follow Up   Return in about 6 months (around 2/4/2022) for Medicare Wellness.  Patient was given instructions and counseling regarding his condition or for health maintenance advice. Please see specific information pulled into the AVS if appropriate.

## 2021-08-04 NOTE — ASSESSMENT & PLAN NOTE
Hypertension is stable, I have asked him to monitor home readings regularly, goal of less than 130/80 along with DASH diet.  Continue Imdur, hydralazine, Coreg and amlodipine.

## 2021-08-04 NOTE — ASSESSMENT & PLAN NOTE
Continue atorvastatin and well-balanced diet.  Lipid panel today and we will adjust therapy if needed.

## 2021-08-05 NOTE — PROGRESS NOTES
Pacheco Mr. Carrillo, your labs are back.  Blood count looks great, no anemia, normal white blood cells and platelets.  Metabolic panel is stable including kidney function, liver enzymes and electrolytes.  Cholesterol panel looks good overall.  Thyroid numbers are normal.  Let me know if you have any questions and we will recheck routinely.  Have a great day,AILYN Corral

## 2021-11-27 ENCOUNTER — IMMUNIZATION (OUTPATIENT)
Dept: VACCINE CLINIC | Facility: HOSPITAL | Age: 66
End: 2021-11-27

## 2021-11-27 PROCEDURE — 0004A ADM SARSCOV2 30MCG/0.3ML BOOSTER: CPT | Performed by: INTERNAL MEDICINE

## 2021-11-27 PROCEDURE — 91300 HC SARSCOV02 VAC 30MCG/0.3ML IM: CPT | Performed by: INTERNAL MEDICINE

## 2021-12-23 DIAGNOSIS — I10 ESSENTIAL HYPERTENSION: ICD-10-CM

## 2021-12-23 RX ORDER — CLOPIDOGREL BISULFATE 75 MG/1
TABLET ORAL
Qty: 90 TABLET | Refills: 1 | Status: SHIPPED | OUTPATIENT
Start: 2021-12-23 | End: 2022-07-05

## 2021-12-23 RX ORDER — HYDRALAZINE HYDROCHLORIDE 50 MG/1
TABLET, FILM COATED ORAL
Qty: 270 TABLET | Refills: 1 | Status: SHIPPED | OUTPATIENT
Start: 2021-12-23 | End: 2022-07-05

## 2021-12-27 DIAGNOSIS — I25.10 ATHEROSCLEROSIS OF NATIVE CORONARY ARTERY OF NATIVE HEART WITHOUT ANGINA PECTORIS: ICD-10-CM

## 2021-12-27 DIAGNOSIS — E78.00 HYPERCHOLESTEROLEMIA: ICD-10-CM

## 2021-12-27 DIAGNOSIS — I10 ESSENTIAL HYPERTENSION: ICD-10-CM

## 2021-12-27 RX ORDER — ISOSORBIDE MONONITRATE 30 MG/1
TABLET, EXTENDED RELEASE ORAL
Qty: 90 TABLET | Refills: 1 | Status: SHIPPED | OUTPATIENT
Start: 2021-12-27 | End: 2022-06-23

## 2021-12-27 RX ORDER — ATORVASTATIN CALCIUM 40 MG/1
TABLET, FILM COATED ORAL
Qty: 90 TABLET | Refills: 1 | Status: SHIPPED | OUTPATIENT
Start: 2021-12-27 | End: 2022-06-23

## 2021-12-27 RX ORDER — CARVEDILOL 12.5 MG/1
TABLET ORAL
Qty: 180 TABLET | Refills: 1 | Status: SHIPPED | OUTPATIENT
Start: 2021-12-27 | End: 2022-06-23

## 2021-12-27 RX ORDER — AMLODIPINE BESYLATE 10 MG/1
TABLET ORAL
Qty: 90 TABLET | Refills: 1 | Status: SHIPPED | OUTPATIENT
Start: 2021-12-27 | End: 2022-06-23

## 2022-02-09 ENCOUNTER — OFFICE VISIT (OUTPATIENT)
Dept: INTERNAL MEDICINE | Facility: CLINIC | Age: 67
End: 2022-02-09

## 2022-02-09 VITALS
SYSTOLIC BLOOD PRESSURE: 138 MMHG | HEIGHT: 72 IN | TEMPERATURE: 97.3 F | OXYGEN SATURATION: 99 % | WEIGHT: 182 LBS | DIASTOLIC BLOOD PRESSURE: 84 MMHG | BODY MASS INDEX: 24.65 KG/M2 | HEART RATE: 61 BPM

## 2022-02-09 DIAGNOSIS — Z00.00 HEALTH CARE MAINTENANCE: Chronic | ICD-10-CM

## 2022-02-09 DIAGNOSIS — R35.1 NOCTURIA: ICD-10-CM

## 2022-02-09 DIAGNOSIS — E78.00 HYPERCHOLESTEROLEMIA: Chronic | ICD-10-CM

## 2022-02-09 DIAGNOSIS — Z12.11 COLON CANCER SCREENING: ICD-10-CM

## 2022-02-09 DIAGNOSIS — Z00.00 MEDICARE ANNUAL WELLNESS VISIT, SUBSEQUENT: Primary | ICD-10-CM

## 2022-02-09 DIAGNOSIS — I65.23 BILATERAL CAROTID ARTERY STENOSIS: Chronic | ICD-10-CM

## 2022-02-09 DIAGNOSIS — R73.01 ELEVATED FASTING BLOOD SUGAR: ICD-10-CM

## 2022-02-09 DIAGNOSIS — I25.10 ATHEROSCLEROSIS OF NATIVE CORONARY ARTERY OF NATIVE HEART WITHOUT ANGINA PECTORIS: Chronic | ICD-10-CM

## 2022-02-09 DIAGNOSIS — I10 PRIMARY HYPERTENSION: Chronic | ICD-10-CM

## 2022-02-09 PROCEDURE — 1126F AMNT PAIN NOTED NONE PRSNT: CPT | Performed by: NURSE PRACTITIONER

## 2022-02-09 PROCEDURE — 99214 OFFICE O/P EST MOD 30 MIN: CPT | Performed by: NURSE PRACTITIONER

## 2022-02-09 PROCEDURE — G0439 PPPS, SUBSEQ VISIT: HCPCS | Performed by: NURSE PRACTITIONER

## 2022-02-09 PROCEDURE — 1159F MED LIST DOCD IN RCRD: CPT | Performed by: NURSE PRACTITIONER

## 2022-02-09 PROCEDURE — 1170F FXNL STATUS ASSESSED: CPT | Performed by: NURSE PRACTITIONER

## 2022-02-09 NOTE — ASSESSMENT & PLAN NOTE
Hypertension is stable. Continue amlodipine, hydralazine, Coreg and Imdur- routine home monitoring, goal <140/80 and DASH diet.

## 2022-02-09 NOTE — PATIENT INSTRUCTIONS
Please call for a follow up with Dr. Wong, cardiology.     Medicare Wellness  Personal Prevention Plan of Service     Date of Office Visit:  2022  Encounter Provider:  AILYN Corral  Place of Service:  [facility]  Patient Name: Blaine Jimenez  :  1955    As part of the Medicare Wellness portion of your visit today, we are providing you with this personalized preventive plan of services (PPPS). This plan is based upon recommendations of the [place] Preventive Services Task Force (USPSTF) and the Advisory Committee on Immunization Practices (ACIP).    This lists the preventive care services that should be considered, and provides dates of when you are due. Items listed as completed are up-to-date and do not require any further intervention.    Health Maintenance   Topic Date Due   • ZOSTER VACCINE (1 of 2) Never done   • INFLUENZA VACCINE  2021   • ANNUAL WELLNESS VISIT  2022   • LIPID PANEL  2022   • COLORECTAL CANCER SCREENING  2028   • TDAP/TD VACCINES (2 - Td or Tdap) 2028   • HEPATITIS C SCREENING  Completed   • COVID-19 Vaccine  Completed   • Pneumococcal Vaccine 65+  Completed       No orders of the defined types were placed in this encounter.      Return in about 6 months (around 2022).

## 2022-02-09 NOTE — ASSESSMENT & PLAN NOTE
Continue Plavix- educated that he is due for cardiology follow up and he plans to call Dr. Wong's office to schedule.

## 2022-02-09 NOTE — PROGRESS NOTES
"The ABCs of the Annual Wellness Visit  Subsequent Medicare Wellness Visit    Chief Complaint   Patient presents with   • Medicare Wellness-subsequent      Subjective    History of Present Illness:  Blaine Jimenez is a 66 y.o. male who presents for a Subsequent Medicare Wellness Visit and follow up on chronic conditions.     He has bilat carotid stenosis hx, takes Plavix. Hasn't seen his vascular specialist Dr. Ruelas since \"before the pandemic.\"     He has CAD, last seen by cardiology August 2020, Dr. oWng. Denies any new cardiac symptoms. He does take Plavix.     He has HTN, well controlled with hydralazine 50 mg TID, Imdur 30 mg daily, almodipine 10 mg daily and Coreg 12.5 mg BID.     He takes atorvastatin 40 mg daily with good compliance for HLD.     Otherwise has been doing well, denies other complaints today.    The following portions of the patient's history were reviewed and   updated as appropriate: allergies, current medications, past family history, past medical history, past social history, past surgical history and problem list.    Compared to one year ago, the patient feels his physical   health is the same.    Compared to one year ago, the patient feels his mental   health is the same.    Recent Hospitalizations:  He was not admitted to the hospital during the last year.       Current Medical Providers:  Patient Care Team:  Elysia Botello APRN as PCP - General (Nurse Practitioner)  Lizzy Wong MD as Consulting Physician (Cardiology)  Jaswinder Ruelas DO (Vascular Surgery)    Outpatient Medications Prior to Visit   Medication Sig Dispense Refill   • amLODIPine (NORVASC) 10 MG tablet TAKE ONE TABLET BY MOUTH DAILY 90 tablet 1   • aspirin 81 MG chewable tablet Chew.     • atorvastatin (LIPITOR) 40 MG tablet TAKE ONE TABLET BY MOUTH DAILY 90 tablet 1   • carvedilol (COREG) 12.5 MG tablet TAKE ONE TABLET BY MOUTH TWICE A  tablet 1   • clopidogrel (PLAVIX) 75 MG tablet TAKE ONE TABLET " "BY MOUTH DAILY 90 tablet 1   • hydrALAZINE (APRESOLINE) 50 MG tablet TAKE ONE TABLET BY MOUTH THREE TIMES A  tablet 1   • isosorbide mononitrate (IMDUR) 30 MG 24 hr tablet TAKE ONE TABLET BY MOUTH DAILY 90 tablet 1   • diclofenac (VOLTAREN) 1 % gel gel Apply 4 g topically to the appropriate area as directed 3 (Three) Times a Day As Needed (joint or back pain). 100 g 1     No facility-administered medications prior to visit.       No opioid medication identified on active medication list. I have reviewed chart for other potential  high risk medication/s and harmful drug interactions in the elderly.          Aspirin is on active medication list. Aspirin use is indicated based on review of current medical condition/s. Pros and cons of this therapy have been discussed today. Benefits of this medication outweigh potential harm.  Patient has been encouraged to continue taking this medication.  .      Patient Active Problem List   Diagnosis   • Spasm of back muscles   • Atherosclerosis of coronary artery   • Generalized anxiety disorder   • Hypercholesterolemia   • Hypertension   • Health care maintenance   • Heart murmur   • Bilateral carotid artery stenosis     Advance Care Planning  Advance Directive is not on file.  ACP discussion was held with the patient during this visit. Patient does not have an advance directive, information provided.    Review of Systems   All other systems reviewed and are negative.       Objective    Vitals:    02/09/22 0810   BP: 138/84   BP Location: Left arm   Patient Position: Sitting   Cuff Size: Adult   Pulse: 61   Temp: 97.3 °F (36.3 °C)   SpO2: 99%   Weight: 82.6 kg (182 lb)   Height: 182.9 cm (72\")   PainSc: 0-No pain     BMI Readings from Last 1 Encounters:   02/09/22 24.68 kg/m²   BMI is within normal parameters. No follow-up required.    Does the patient have evidence of cognitive impairment? No    Physical Exam  Vitals and nursing note reviewed.   Constitutional:       " General: He is not in acute distress.     Appearance: Normal appearance. He is well-developed. He is not ill-appearing, toxic-appearing or diaphoretic.   HENT:      Head: Normocephalic and atraumatic.      Right Ear: External ear normal.      Left Ear: External ear normal.   Eyes:      Pupils: Pupils are equal, round, and reactive to light.   Cardiovascular:      Rate and Rhythm: Normal rate and regular rhythm.      Pulses: Normal pulses.      Heart sounds: Normal heart sounds.      Comments: No peripheral edema.  Pulmonary:      Effort: Pulmonary effort is normal. No respiratory distress.      Breath sounds: Normal breath sounds. No stridor. No wheezing, rhonchi or rales.   Chest:      Chest wall: No tenderness.   Abdominal:      General: Bowel sounds are normal. There is no distension.      Palpations: Abdomen is soft. There is no mass.      Tenderness: There is no abdominal tenderness. There is no right CVA tenderness, left CVA tenderness, guarding or rebound.      Hernia: No hernia is present.   Musculoskeletal:         General: Normal range of motion.      Cervical back: Normal range of motion and neck supple.   Skin:     General: Skin is warm and dry.      Capillary Refill: Capillary refill takes less than 2 seconds.   Neurological:      General: No focal deficit present.      Mental Status: He is alert and oriented to person, place, and time. Mental status is at baseline.   Psychiatric:         Mood and Affect: Mood normal.         Behavior: Behavior normal.         Thought Content: Thought content normal.         Judgment: Judgment normal.         Current outpatient and discharge medications have been reconciled for the patient.  Reviewed by: AILYN Corral    Lab Results   Component Value Date    GLUCOSE 104 (H) 08/04/2021    BUN 16 08/04/2021    CREATININE 0.87 08/04/2021    EGFRIFNONA 88 08/04/2021    EGFRIFAFRI 106 08/04/2021    BCR 18.4 08/04/2021    K 4.6 08/04/2021    CO2 27.1 08/04/2021     CALCIUM 9.6 08/04/2021    PROTENTOTREF 7.0 08/04/2021    ALBUMIN 4.50 08/04/2021    LABIL2 1.8 08/04/2021    AST 22 08/04/2021    ALT 21 08/04/2021              HEALTH RISK ASSESSMENT    Smoking Status:  Social History     Tobacco Use   Smoking Status Never Smoker   Smokeless Tobacco Never Used   Tobacco Comment    daily caffeine use     Alcohol Consumption:  Social History     Substance and Sexual Activity   Alcohol Use Yes   • Alcohol/week: 6.0 standard drinks   • Types: 6 Cans of beer per week     Fall Risk Screen:    WIN Fall Risk Assessment was completed, and patient is at LOW risk for falls.Assessment completed on:2/9/2022    Depression Screening:  PHQ-2/PHQ-9 Depression Screening 2/9/2022   Little interest or pleasure in doing things 0   Feeling down, depressed, or hopeless 0   Trouble falling or staying asleep, or sleeping too much 0   Feeling tired or having little energy 0   Poor appetite or overeating 0   Feeling bad about yourself - or that you are a failure or have let yourself or your family down 0   Trouble concentrating on things, such as reading the newspaper or watching television 0   Moving or speaking so slowly that other people could have noticed. Or the opposite - being so fidgety or restless that you have been moving around a lot more than usual 0   Thoughts that you would be better off dead, or of hurting yourself in some way 0   Total Score 0   If you checked off any problems, how difficult have these problems made it for you to do your work, take care of things at home, or get along with other people? Not difficult at all       Health Habits and Functional and Cognitive Screening:  Functional & Cognitive Status 2/9/2022   Do you have difficulty preparing food and eating? No   Do you have difficulty bathing yourself, getting dressed or grooming yourself? No   Do you have difficulty using the toilet? No   Do you have difficulty moving around from place to place? No   Do you have trouble  with steps or getting out of a bed or a chair? No   Current Diet Well Balanced Diet   Dental Exam Not up to date   Eye Exam Not up to date   Exercise (times per week) 8 times per week   Current Exercises Include Other   Do you need help using the phone?  No   Are you deaf or do you have serious difficulty hearing?  No   Do you need help with transportation? No   Do you need help shopping? No   Do you need help preparing meals?  No   Do you need help with housework?  No   Do you need help with laundry? No   Do you need help taking your medications? No   Do you need help managing money? No   Do you ever drive or ride in a car without wearing a seat belt? No   Have you felt unusual stress, anger or loneliness in the last month? No   Who do you live with? Child   If you need help, do you have trouble finding someone available to you? No   Have you been bothered in the last four weeks by sexual problems? No   Do you have difficulty concentrating, remembering or making decisions? No       Age-appropriate Screening Schedule:  Refer to the list below for future screening recommendations based on patient's age, sex and/or medical conditions. Orders for these recommended tests are listed in the plan section. The patient has been provided with a written plan.    Health Maintenance   Topic Date Due   • ZOSTER VACCINE (1 of 2) Never done   • INFLUENZA VACCINE  08/01/2021   • LIPID PANEL  08/04/2022   • TDAP/TD VACCINES (2 - Td or Tdap) 08/08/2028              Assessment/Plan   CMS Preventative Services Quick Reference  Risk Factors Identified During Encounter  Cardiovascular Disease  Dementia/Memory   Depression/Dysphoria  Obesity/Overweight   The above risks/problems have been discussed with the patient.  Follow up actions/plans if indicated are seen below in the Assessment/Plan Section.  Pertinent information has been shared with the patient in the After Visit Summary.    Diagnoses and all orders for this visit:    1. Medicare  annual wellness visit, subsequent (Primary)    2. Hypercholesterolemia  Assessment & Plan:  Continue atorvastatin. Lipid panel today and will adjust tx if needed.    Orders:  -     Lipid panel    3. Primary hypertension  Assessment & Plan:  Hypertension is stable. Continue amlodipine, hydralazine, Coreg and Imdur- routine home monitoring, goal <140/80 and DASH diet.    Orders:  -     CBC No Differential  -     Comprehensive metabolic panel  -     TSH Rfx On Abnormal To Free T4    4. Atherosclerosis of native coronary artery of native heart without angina pectoris  Assessment & Plan:  Continue Plavix- educated that he is due for cardiology follow up and he plans to call Dr. Wong's office to schedule.      5. Health care maintenance  Assessment & Plan:  Due for screening colonoscopy- discussed and he is agreeable. Orders placed.    Orders:  -     Ambulatory Referral For Screening Colonoscopy    6. Elevated fasting blood sugar  Comments:  A1C today.  Orders:  -     Hemoglobin A1c    7. Nocturia  Comments:  Stable. PSA today.  Orders:  -     PSA DIAGNOSTIC ONLY    8. Colon cancer screening  -     Ambulatory Referral For Screening Colonoscopy    9. Bilateral carotid artery stenosis  Assessment & Plan:  Due for recheck- I ordered carotid US bilat- encouraged to schedule with his vascular specialist for follow up as well.    Orders:  -     Duplex Carotid Ultrasound CAR; Future      Follow Up:   Return in about 6 months (around 8/9/2022).     We will contact pt with lab results and further recommendations. Follow up PRN and I will see him back in 6 months for recheck of chronic conditions.      An After Visit Summary and PPPS were made available to the patient.

## 2022-02-09 NOTE — ASSESSMENT & PLAN NOTE
Due for recheck- I ordered carotid US bilat- encouraged to schedule with his vascular specialist for follow up as well.

## 2022-02-10 ENCOUNTER — PRE-PROCEDURE SCREENING (OUTPATIENT)
Dept: GASTROENTEROLOGY | Facility: CLINIC | Age: 67
End: 2022-02-10

## 2022-02-10 LAB
ALBUMIN SERPL-MCNC: 4.3 G/DL (ref 3.8–4.8)
ALBUMIN/GLOB SERPL: 1.8 {RATIO} (ref 1.2–2.2)
ALP SERPL-CCNC: 89 IU/L (ref 44–121)
ALT SERPL-CCNC: 19 IU/L (ref 0–44)
AST SERPL-CCNC: 23 IU/L (ref 0–40)
BILIRUB SERPL-MCNC: 0.5 MG/DL (ref 0–1.2)
BUN SERPL-MCNC: 19 MG/DL (ref 8–27)
BUN/CREAT SERPL: 20 (ref 10–24)
CALCIUM SERPL-MCNC: 9 MG/DL (ref 8.6–10.2)
CHLORIDE SERPL-SCNC: 105 MMOL/L (ref 96–106)
CHOLEST SERPL-MCNC: 130 MG/DL (ref 100–199)
CO2 SERPL-SCNC: 21 MMOL/L (ref 20–29)
CREAT SERPL-MCNC: 0.94 MG/DL (ref 0.76–1.27)
ERYTHROCYTE [DISTWIDTH] IN BLOOD BY AUTOMATED COUNT: 12.1 % (ref 11.6–15.4)
GLOBULIN SER CALC-MCNC: 2.4 G/DL (ref 1.5–4.5)
GLUCOSE SERPL-MCNC: 102 MG/DL (ref 65–99)
HBA1C MFR BLD: 5.8 % (ref 4.8–5.6)
HCT VFR BLD AUTO: 40.3 % (ref 37.5–51)
HDLC SERPL-MCNC: 35 MG/DL
HGB BLD-MCNC: 13.5 G/DL (ref 13–17.7)
LDLC SERPL CALC-MCNC: 78 MG/DL (ref 0–99)
MCH RBC QN AUTO: 30.4 PG (ref 26.6–33)
MCHC RBC AUTO-ENTMCNC: 33.5 G/DL (ref 31.5–35.7)
MCV RBC AUTO: 91 FL (ref 79–97)
PLATELET # BLD AUTO: 239 X10E3/UL (ref 150–450)
POTASSIUM SERPL-SCNC: 4.5 MMOL/L (ref 3.5–5.2)
PROT SERPL-MCNC: 6.7 G/DL (ref 6–8.5)
PSA SERPL-MCNC: 0.3 NG/ML (ref 0–4)
RBC # BLD AUTO: 4.44 X10E6/UL (ref 4.14–5.8)
SODIUM SERPL-SCNC: 139 MMOL/L (ref 134–144)
TRIGL SERPL-MCNC: 90 MG/DL (ref 0–149)
TSH SERPL DL<=0.005 MIU/L-ACNC: 1.6 UIU/ML (ref 0.45–4.5)
VLDLC SERPL CALC-MCNC: 17 MG/DL (ref 5–40)
WBC # BLD AUTO: 5.6 X10E3/UL (ref 3.4–10.8)

## 2022-02-15 PROBLEM — R73.03 PREDIABETES: Status: ACTIVE | Noted: 2022-02-15

## 2022-02-15 NOTE — PROGRESS NOTES
"Pacheco Mr. Jimenez, labs are back.  Blood count is normal with no anemia, normal white blood cells and platelets.  Kidney function, liver enzymes and electrolytes are normal.  Cholesterol panel looks stable except for mildly low HDL which are the \"good\" cholesterol.  Please engage in regular exercise which will help to bring these up.  LDL and total cholesterol are both normal.  Thyroid numbers are stable along with your PSA.  A1c is in the prediabetic range at 5.8, please limit intake of concentrated sweets and carbs in your diet and we will recheck routinely.  Let me know if you have any questions and have a great day,AILYN Corral"

## 2022-02-28 ENCOUNTER — HOSPITAL ENCOUNTER (OUTPATIENT)
Dept: CARDIOLOGY | Facility: HOSPITAL | Age: 67
Discharge: HOME | End: 2022-02-28
Admitting: NURSE PRACTITIONER

## 2022-02-28 DIAGNOSIS — I65.22 OCCLUSION OF LEFT INTERNAL CAROTID ARTERY: Primary | ICD-10-CM

## 2022-02-28 DIAGNOSIS — I65.23 BILATERAL CAROTID ARTERY STENOSIS: ICD-10-CM

## 2022-02-28 LAB
BH CV XLRA MEAS LEFT DIST CCA EDV: 12.3 CM/SEC
BH CV XLRA MEAS LEFT DIST CCA PSV: 85.6 CM/SEC
BH CV XLRA MEAS LEFT DIST ICA PSV: 0 CM/SEC
BH CV XLRA MEAS LEFT MID ICA PSV: 0 CM/SEC
BH CV XLRA MEAS LEFT PROX CCA EDV: 10 CM/SEC
BH CV XLRA MEAS LEFT PROX CCA PSV: 83.8 CM/SEC
BH CV XLRA MEAS LEFT PROX ECA EDV: -10.6 CM/SEC
BH CV XLRA MEAS LEFT PROX ECA PSV: -111 CM/SEC
BH CV XLRA MEAS LEFT PROX ICA PSV: 0 CM/SEC
BH CV XLRA MEAS LEFT PROX SCLA PSV: 155 CM/SEC
BH CV XLRA MEAS LEFT VERTEBRAL A EDV: 2.8 CM/SEC
BH CV XLRA MEAS LEFT VERTEBRAL A PSV: 17.7 CM/SEC
BH CV XLRA MEAS RIGHT DIST CCA EDV: -24 CM/SEC
BH CV XLRA MEAS RIGHT DIST CCA PSV: -112 CM/SEC
BH CV XLRA MEAS RIGHT DIST ICA EDV: -26.7 CM/SEC
BH CV XLRA MEAS RIGHT DIST ICA PSV: -106 CM/SEC
BH CV XLRA MEAS RIGHT ICA/CCA RATIO: 1.47
BH CV XLRA MEAS RIGHT MID ICA EDV: -45.2 CM/SEC
BH CV XLRA MEAS RIGHT MID ICA PSV: -132 CM/SEC
BH CV XLRA MEAS RIGHT PROX CCA EDV: 22.3 CM/SEC
BH CV XLRA MEAS RIGHT PROX CCA PSV: 108 CM/SEC
BH CV XLRA MEAS RIGHT PROX ECA EDV: -14.1 CM/SEC
BH CV XLRA MEAS RIGHT PROX ECA PSV: -133 CM/SEC
BH CV XLRA MEAS RIGHT PROX ICA EDV: -43.2 CM/SEC
BH CV XLRA MEAS RIGHT PROX ICA PSV: -165 CM/SEC
BH CV XLRA MEAS RIGHT PROX SCLA PSV: 141 CM/SEC
BH CV XLRA MEAS RIGHT VERTEBRAL A EDV: 22.9 CM/SEC
BH CV XLRA MEAS RIGHT VERTEBRAL A PSV: 86.2 CM/SEC
LEFT ARM BP: NORMAL MMHG
MAXIMAL PREDICTED HEART RATE: 153 BPM
RIGHT ARM BP: NORMAL MMHG
STRESS TARGET HR: 130 BPM

## 2022-02-28 PROCEDURE — 93880 EXTRACRANIAL BILAT STUDY: CPT

## 2022-03-25 ENCOUNTER — TELEPHONE (OUTPATIENT)
Dept: INTERNAL MEDICINE | Facility: CLINIC | Age: 67
End: 2022-03-25

## 2022-03-25 NOTE — TELEPHONE ENCOUNTER
Pt aware of results.    Pt stated he went to his appt with Dr. Ruelas's office. Pt states that the provider didn't seem to be concerned. He told the Pt to follow up in 1 year. Please advise?

## 2022-03-25 NOTE — TELEPHONE ENCOUNTER
He can follow routinely with Dr. Ruelas and follow his recommendations, I just wanted to make sure he has seen him recently in conjunction with this scan.

## 2022-03-25 NOTE — TELEPHONE ENCOUNTER
OTIS for Pt to return call to clinic    Elysia Botello, APRN   2/28/2022  1:07 PM EST         Please let pt know that his carotid scan is back showing worsening of the left internal carotid, appearing occluded. The right internal carotid is showing moderate stenosis. I have placed an urgent referral to get him back into his vascular specialist, Dr. Ruelas. Please schedule as soon as contacted.

## 2022-06-21 DIAGNOSIS — I25.10 ATHEROSCLEROSIS OF NATIVE CORONARY ARTERY OF NATIVE HEART WITHOUT ANGINA PECTORIS: ICD-10-CM

## 2022-06-21 DIAGNOSIS — E78.00 HYPERCHOLESTEROLEMIA: ICD-10-CM

## 2022-06-21 DIAGNOSIS — I10 ESSENTIAL HYPERTENSION: ICD-10-CM

## 2022-06-23 RX ORDER — ATORVASTATIN CALCIUM 40 MG/1
TABLET, FILM COATED ORAL
Qty: 90 TABLET | Refills: 1 | Status: SHIPPED | OUTPATIENT
Start: 2022-06-23 | End: 2023-01-06 | Stop reason: SDUPTHER

## 2022-06-23 RX ORDER — ISOSORBIDE MONONITRATE 30 MG/1
TABLET, EXTENDED RELEASE ORAL
Qty: 90 TABLET | Refills: 1 | Status: SHIPPED | OUTPATIENT
Start: 2022-06-23 | End: 2023-01-06 | Stop reason: SDUPTHER

## 2022-06-23 RX ORDER — CARVEDILOL 12.5 MG/1
TABLET ORAL
Qty: 180 TABLET | Refills: 1 | Status: SHIPPED | OUTPATIENT
Start: 2022-06-23 | End: 2023-01-06 | Stop reason: SDUPTHER

## 2022-06-23 RX ORDER — AMLODIPINE BESYLATE 10 MG/1
TABLET ORAL
Qty: 90 TABLET | Refills: 1 | Status: SHIPPED | OUTPATIENT
Start: 2022-06-23 | End: 2023-01-06 | Stop reason: SDUPTHER

## 2022-07-01 DIAGNOSIS — I10 ESSENTIAL HYPERTENSION: ICD-10-CM

## 2022-07-05 RX ORDER — CLOPIDOGREL BISULFATE 75 MG/1
TABLET ORAL
Qty: 90 TABLET | Refills: 1 | Status: SHIPPED | OUTPATIENT
Start: 2022-07-05 | End: 2023-02-10 | Stop reason: SDUPTHER

## 2022-07-05 RX ORDER — HYDRALAZINE HYDROCHLORIDE 50 MG/1
TABLET, FILM COATED ORAL
Qty: 270 TABLET | Refills: 1 | Status: SHIPPED | OUTPATIENT
Start: 2022-07-05 | End: 2023-01-06 | Stop reason: SDUPTHER

## 2022-08-09 ENCOUNTER — OFFICE VISIT (OUTPATIENT)
Dept: INTERNAL MEDICINE | Facility: CLINIC | Age: 67
End: 2022-08-09

## 2022-08-09 VITALS
WEIGHT: 174 LBS | DIASTOLIC BLOOD PRESSURE: 72 MMHG | HEIGHT: 72 IN | TEMPERATURE: 97.4 F | RESPIRATION RATE: 18 BRPM | HEART RATE: 64 BPM | BODY MASS INDEX: 23.57 KG/M2 | OXYGEN SATURATION: 96 % | SYSTOLIC BLOOD PRESSURE: 136 MMHG

## 2022-08-09 DIAGNOSIS — R73.03 PREDIABETES: ICD-10-CM

## 2022-08-09 DIAGNOSIS — E78.00 HYPERCHOLESTEROLEMIA: Primary | Chronic | ICD-10-CM

## 2022-08-09 DIAGNOSIS — I10 PRIMARY HYPERTENSION: Chronic | ICD-10-CM

## 2022-08-09 DIAGNOSIS — I65.23 BILATERAL CAROTID ARTERY STENOSIS: Chronic | ICD-10-CM

## 2022-08-09 DIAGNOSIS — H65.193 ACUTE MIDDLE EAR EFFUSION, BILATERAL: ICD-10-CM

## 2022-08-09 DIAGNOSIS — I25.10 ATHEROSCLEROSIS OF NATIVE CORONARY ARTERY OF NATIVE HEART WITHOUT ANGINA PECTORIS: Chronic | ICD-10-CM

## 2022-08-09 DIAGNOSIS — Z00.00 HEALTH CARE MAINTENANCE: Chronic | ICD-10-CM

## 2022-08-09 PROCEDURE — 99214 OFFICE O/P EST MOD 30 MIN: CPT | Performed by: NURSE PRACTITIONER

## 2022-08-09 NOTE — PROGRESS NOTES
"Chief Complaint  Hypertension (6 month follow up)    Subjective        Blaine Jimenez presents to [facility]  Patient presents for 6-month follow-up.  This is a 67-year-old male.    He has hypertension, has not been checking blood pressure routinely at home.  He takes amlodipine 10 mg daily, hydralazine 50 mg 3 times daily, Imdur 30 mg daily and Coreg 12.5 mg twice daily with good compliance.    He is prediabetic, last A1c on 2/9/2022 was 5.8.    He has coronary artery disease, last saw his cardiologist, Dr. Wong in August 2020.  He called this morning to make a follow-up appointment and plans to schedule soon.    He has carotid stenosis and follows with Dr. Ruelas, vascular yearly.  Maintained on Plavix.    He has hyperlipidemia endorsing good compliance with atorvastatin 40 mg daily.    Overall reports that he is doing well and denies development of other new issues today.    Hypertension  This is a chronic problem. The current episode started more than 1 year ago. The problem has been waxing and waning since onset. The problem is resistant. Associated symptoms include anxiety and neck pain. Pertinent negatives include no blurred vision, chest pain, headaches, malaise/fatigue, orthopnea, palpitations, peripheral edema, PND, shortness of breath or sweats. There are no associated agents to hypertension. Risk factors for coronary artery disease include family history and stress. There are no compliance problems.        Objective   Vital Signs:  /72 Comment: manual recheck by NP  Pulse 64   Temp 97.4 °F (36.3 °C)   Resp 18   Ht 182.9 cm (72\")   Wt 78.9 kg (174 lb)   SpO2 96%   BMI 23.60 kg/m²   Estimated body mass index is 23.6 kg/m² as calculated from the following:    Height as of this encounter: 182.9 cm (72\").    Weight as of this encounter: 78.9 kg (174 lb).    BMI is within normal parameters. No other follow-up for BMI required.      Physical Exam  Vitals and nursing " note reviewed.   Constitutional:       General: He is not in acute distress.     Appearance: Normal appearance. He is well-developed. He is not ill-appearing, toxic-appearing or diaphoretic.   HENT:      Head: Normocephalic and atraumatic.      Right Ear: Ear canal and external ear normal. A middle ear effusion is present. Tympanic membrane is not erythematous.      Left Ear: Ear canal and external ear normal. A middle ear effusion is present. Tympanic membrane is not erythematous.   Eyes:      Pupils: Pupils are equal, round, and reactive to light.   Cardiovascular:      Rate and Rhythm: Normal rate and regular rhythm.      Pulses: Normal pulses.      Heart sounds: Normal heart sounds.      Comments: No peripheral edema  Pulmonary:      Effort: Pulmonary effort is normal. No respiratory distress.      Breath sounds: Normal breath sounds. No stridor. No wheezing, rhonchi or rales.   Chest:      Chest wall: No tenderness.   Abdominal:      General: Bowel sounds are normal. There is no distension.      Palpations: Abdomen is soft. There is no mass.      Tenderness: There is no abdominal tenderness. There is no right CVA tenderness, left CVA tenderness, guarding or rebound.      Hernia: No hernia is present.   Musculoskeletal:         General: Normal range of motion.      Cervical back: Normal range of motion and neck supple.   Skin:     General: Skin is warm and dry.      Capillary Refill: Capillary refill takes less than 2 seconds.   Neurological:      General: No focal deficit present.      Mental Status: He is alert and oriented to person, place, and time. Mental status is at baseline.   Psychiatric:         Mood and Affect: Mood normal.         Behavior: Behavior normal.         Thought Content: Thought content normal.         Judgment: Judgment normal.        Result Review :  The following data was reviewed by: AILYN Corral on 08/09/2022:  Common labs    Common Labsle 2/9/22 2/9/22 2/9/22 2/9/22 2/9/22     0900 0900 0900 0900 0900   Glucose  102 (A)      BUN  19      Creatinine  0.94      eGFR Non  Am  84      eGFR African Am  97      Sodium  139      Potassium  4.5      Chloride  105      Calcium  9.0      Total Protein  6.7      Albumin  4.3      Total Bilirubin  0.5      Alkaline Phosphatase  89      AST (SGOT)  23      ALT (SGPT)  19      WBC 5.6       Hemoglobin 13.5       Hematocrit 40.3       Platelets 239       Total Cholesterol   130     Triglycerides   90     HDL Cholesterol   35 (A)     LDL Cholesterol    78     Hemoglobin A1C     5.8 (A)   PSA    0.3    (A) Abnormal value       Comments are available for some flowsheets but are not being displayed.           Current outpatient and discharge medications have been reconciled for the patient.  Reviewed by: AILYN Corral           Assessment and Plan   Diagnoses and all orders for this visit:    1. Hypercholesterolemia (Primary)  Assessment & Plan:  Continue atorvastatin.  Lipid panel today and we will adjust therapy if needed.    Orders:  -     Lipid panel    2. Primary hypertension  Assessment & Plan:  Hypertension is stable, continue amlodipine, Coreg, Imdur and hydralazine, routine home monitoring for goal of less than 140/80 and DASH diet.    Orders:  -     CBC No Differential  -     Comprehensive metabolic panel  -     TSH Rfx On Abnormal To Free T4    3. Bilateral carotid artery stenosis  Assessment & Plan:  Stable as of last carotid ultrasound.  He sees Dr. Ruelas, vascular yearly.      4. Prediabetes  Assessment & Plan:  Continue well-balanced diet low in carbs and sugars.  Repeat A1c today and we will adjust therapy if needed.    Orders:  -     Hemoglobin A1c    5. Health care maintenance  Assessment & Plan:  Continue regular dental and vision exams.    PSA is up-to-date as of 2/9/2022.    He is encouraged to schedule the colonoscopy that I ordered in February 2022.  He plans to do that soon.      6. Atherosclerosis of native coronary  artery of native heart without angina pectoris  Assessment & Plan:  Continue aspirin 81 mg daily.  No new symptoms but I have asked him to make sure that he follows up with his cardiologist, Dr. Wong routinely.      7. Acute middle ear effusion, bilateral  Comments:  Add daily Flonase.         We will contact patient with lab results and any further recommendations.  Follow-up as needed and I will see him back in 6 months for a Medicare wellness visit.    Follow Up   Return in about 6 months (around 2/9/2023) for Medicare Wellness.  Patient was given instructions and counseling regarding his condition or for health maintenance advice. Please see specific information pulled into the AVS if appropriate.

## 2022-08-09 NOTE — ASSESSMENT & PLAN NOTE
Continue aspirin 81 mg daily.  No new symptoms but I have asked him to make sure that he follows up with his cardiologist, Dr. Wong routinely.

## 2022-08-09 NOTE — ASSESSMENT & PLAN NOTE
Hypertension is stable, continue amlodipine, Coreg, Imdur and hydralazine, routine home monitoring for goal of less than 140/80 and DASH diet.

## 2022-08-09 NOTE — ASSESSMENT & PLAN NOTE
Continue regular dental and vision exams.    PSA is up-to-date as of 2/9/2022.    He is encouraged to schedule the colonoscopy that I ordered in February 2022.  He plans to do that soon.

## 2022-08-10 LAB
ALBUMIN SERPL-MCNC: 4.3 G/DL (ref 3.8–4.8)
ALBUMIN/GLOB SERPL: 1.9 {RATIO} (ref 1.2–2.2)
ALP SERPL-CCNC: 101 IU/L (ref 44–121)
ALT SERPL-CCNC: 16 IU/L (ref 0–44)
AST SERPL-CCNC: 16 IU/L (ref 0–40)
BILIRUB SERPL-MCNC: 0.4 MG/DL (ref 0–1.2)
BUN SERPL-MCNC: 20 MG/DL (ref 8–27)
BUN/CREAT SERPL: 25 (ref 10–24)
CALCIUM SERPL-MCNC: 9.4 MG/DL (ref 8.6–10.2)
CHLORIDE SERPL-SCNC: 105 MMOL/L (ref 96–106)
CHOLEST SERPL-MCNC: 134 MG/DL (ref 100–199)
CO2 SERPL-SCNC: 22 MMOL/L (ref 20–29)
CREAT SERPL-MCNC: 0.8 MG/DL (ref 0.76–1.27)
EGFRCR SERPLBLD CKD-EPI 2021: 96 ML/MIN/1.73
ERYTHROCYTE [DISTWIDTH] IN BLOOD BY AUTOMATED COUNT: 12.4 % (ref 11.6–15.4)
GLOBULIN SER CALC-MCNC: 2.3 G/DL (ref 1.5–4.5)
GLUCOSE SERPL-MCNC: 112 MG/DL (ref 65–99)
HBA1C MFR BLD: 5.6 % (ref 4.8–5.6)
HCT VFR BLD AUTO: 41.6 % (ref 37.5–51)
HDLC SERPL-MCNC: 33 MG/DL
HGB BLD-MCNC: 13.8 G/DL (ref 13–17.7)
LDLC SERPL CALC-MCNC: 79 MG/DL (ref 0–99)
MCH RBC QN AUTO: 31.1 PG (ref 26.6–33)
MCHC RBC AUTO-ENTMCNC: 33.2 G/DL (ref 31.5–35.7)
MCV RBC AUTO: 94 FL (ref 79–97)
PLATELET # BLD AUTO: 256 X10E3/UL (ref 150–450)
POTASSIUM SERPL-SCNC: 4.6 MMOL/L (ref 3.5–5.2)
PROT SERPL-MCNC: 6.6 G/DL (ref 6–8.5)
RBC # BLD AUTO: 4.44 X10E6/UL (ref 4.14–5.8)
SODIUM SERPL-SCNC: 141 MMOL/L (ref 134–144)
TRIGL SERPL-MCNC: 119 MG/DL (ref 0–149)
TSH SERPL DL<=0.005 MIU/L-ACNC: 1.95 UIU/ML (ref 0.45–4.5)
VLDLC SERPL CALC-MCNC: 22 MG/DL (ref 5–40)
WBC # BLD AUTO: 6.9 X10E3/UL (ref 3.4–10.8)

## 2022-08-10 NOTE — PROGRESS NOTES
"Good afternoon Mr. Jimenez, labs are back. Blood count looks good with no anemia, normal white cells and platelets. Kidney function, liver enzymes and electrolytes stable. Cholesterol panel overall well controlled. Continue with regular exercise which will help raise the \"good cholesterol\" the HDL. Continue atorvastatin. Thyroid numbers are stable. A1C improved to 5.6, down out of the prediabetic range which is great. Let me know of any questions and have a great day,  ALIYN Corral"

## 2023-01-06 ENCOUNTER — OFFICE VISIT (OUTPATIENT)
Dept: FAMILY MEDICINE CLINIC | Facility: CLINIC | Age: 68
End: 2023-01-06
Payer: MEDICARE

## 2023-01-06 VITALS
SYSTOLIC BLOOD PRESSURE: 132 MMHG | BODY MASS INDEX: 23.43 KG/M2 | HEART RATE: 64 BPM | TEMPERATURE: 97.7 F | DIASTOLIC BLOOD PRESSURE: 70 MMHG | WEIGHT: 173 LBS | HEIGHT: 72 IN | RESPIRATION RATE: 18 BRPM | OXYGEN SATURATION: 98 %

## 2023-01-06 DIAGNOSIS — I25.10 ATHEROSCLEROSIS OF NATIVE CORONARY ARTERY OF NATIVE HEART WITHOUT ANGINA PECTORIS: ICD-10-CM

## 2023-01-06 DIAGNOSIS — I10 ESSENTIAL HYPERTENSION: ICD-10-CM

## 2023-01-06 DIAGNOSIS — E78.00 HYPERCHOLESTEROLEMIA: ICD-10-CM

## 2023-01-06 DIAGNOSIS — Z12.5 SCREENING FOR PROSTATE CANCER: ICD-10-CM

## 2023-01-06 DIAGNOSIS — I10 ESSENTIAL HYPERTENSION: Primary | ICD-10-CM

## 2023-01-06 PROCEDURE — 99214 OFFICE O/P EST MOD 30 MIN: CPT | Performed by: FAMILY MEDICINE

## 2023-01-06 RX ORDER — HYDRALAZINE HYDROCHLORIDE 50 MG/1
50 TABLET, FILM COATED ORAL 3 TIMES DAILY
Qty: 270 TABLET | Refills: 3 | Status: SHIPPED | OUTPATIENT
Start: 2023-01-06

## 2023-01-06 RX ORDER — AMLODIPINE BESYLATE 10 MG/1
10 TABLET ORAL DAILY
Qty: 90 TABLET | Refills: 3 | Status: SHIPPED | OUTPATIENT
Start: 2023-01-06

## 2023-01-06 RX ORDER — ISOSORBIDE MONONITRATE 30 MG/1
30 TABLET, EXTENDED RELEASE ORAL DAILY
Qty: 90 TABLET | Refills: 3 | Status: SHIPPED | OUTPATIENT
Start: 2023-01-06

## 2023-01-06 RX ORDER — ATORVASTATIN CALCIUM 40 MG/1
40 TABLET, FILM COATED ORAL DAILY
Qty: 90 TABLET | Refills: 3 | Status: SHIPPED | OUTPATIENT
Start: 2023-01-06

## 2023-01-06 RX ORDER — CARVEDILOL 12.5 MG/1
12.5 TABLET ORAL 2 TIMES DAILY
Qty: 180 TABLET | Refills: 3 | Status: SHIPPED | OUTPATIENT
Start: 2023-01-06

## 2023-01-06 NOTE — PROGRESS NOTES
"Chief Complaint  Chief Complaint   Patient presents with   • Establish Care     New Pt    • Med Refill     Pt here for med refills      Subjective    History of Present Illness        Blaine Jimenez presents to [facility] for   History of Present Illness  Hypertension  The patient states he presents to the office today for a follow up on his hypertension. He reports that he seen a doctor that left the practice, so a nurse practitioner wrote his prescriptions for his  medications at that appointment. He adds he is here today to obtain refills on his medications. The patient notes he had a heart attack approximately 5 years ago. He notes they did not put a stent in because it was at a right angle. He denies chest pain or shortness of breath. He states he has no problems with his blood pressure medication, and reports no dizziness. He adds one of his carotid arteries is blocked 100 percent, and the other one is blocked 60 percent. He went and saw a vascular surgeon, and is inquiring if he should continue this. He was told the arteries in the back of his neck were \"taking up the slack.\"  He had an appointment with a nurse practitioner, and she was very alarmed over his heart; however, the vascular surgeon was dismissive.     Hyperlipidemia  The patient states he feels that his lipid panels have been pretty normal. He reports his last laboratory work-up was in 08/2022. He admits he has occasional cramping; however, does not feel it is the medication.      Objective   Vital Signs:   Visit Vitals  /70   Pulse 64   Temp 97.7 °F (36.5 °C)   Resp 18   Ht 182.9 cm (72\")   Wt 78.5 kg (173 lb)   SpO2 98%   BMI 23.46 kg/m²       BMI is within normal parameters. No other follow-up for BMI required.     Physical Exam  Vitals reviewed.   Constitutional:       Appearance: He is well-developed.   HENT:      Head: Normocephalic.      Right Ear: External ear normal.      Left Ear: External ear " normal.      Nose: Nose normal.   Eyes:      Conjunctiva/sclera: Conjunctivae normal.   Cardiovascular:      Rate and Rhythm: Normal rate and regular rhythm.   Pulmonary:      Effort: Pulmonary effort is normal.      Breath sounds: Normal breath sounds.   Musculoskeletal:         General: Normal range of motion.      Cervical back: Normal range of motion and neck supple.   Skin:     General: Skin is warm and dry.      Capillary Refill: Capillary refill takes less than 2 seconds.   Neurological:      Mental Status: He is alert and oriented to person, place, and time.            Result Review :                      Assessment and Plan      Diagnoses and all orders for this visit:    1. Essential hypertension  Comments:  Will be given refills on isosorbide mononitrate, hydralazine, amlodipine, and carvedilol.   Will be ordered a laboratory work-up before his next visit.   Assessment & Plan:  Hypertension is improving with treatment.  Continue current treatment regimen.  Dietary sodium restriction.  Weight loss.  Regular aerobic exercise.  Continue current medications.  Blood pressure will be reassessed at the next regular appointment.    Orders:  -     isosorbide mononitrate (IMDUR) 30 MG 24 hr tablet; Take 1 tablet by mouth Daily.  Dispense: 90 tablet; Refill: 3  -     hydrALAZINE (APRESOLINE) 50 MG tablet; Take 1 tablet by mouth 3 (Three) Times a Day.  Dispense: 270 tablet; Refill: 3  -     amLODIPine (NORVASC) 10 MG tablet; Take 1 tablet by mouth Daily.  Dispense: 90 tablet; Refill: 3  -     carvedilol (COREG) 12.5 MG tablet; Take 1 tablet by mouth 2 (Two) Times a Day.  Dispense: 180 tablet; Refill: 3    2. Atherosclerosis of native coronary artery of native heart without angina pectoris  Assessment & Plan:  Coronary artery disease is unchanged.  Continue current treatment regimen.  Dietary sodium restriction.  Weight loss.  Regular aerobic exercise.  Continue current medications.  Cardiac status will be reassessed  in 6 months.    Orders:  -     carvedilol (COREG) 12.5 MG tablet; Take 1 tablet by mouth 2 (Two) Times a Day.  Dispense: 180 tablet; Refill: 3    3. Hypercholesterolemia  Comments:  The patient will be given refills on atorvastatin 40 mg.   Assessment & Plan:  Lipid abnormalities are unchanged.  Nutritional counseling was provided. and Pharmacotherapy as ordered.  Lipids will be reassessed in 6 months.    Orders:  -     atorvastatin (LIPITOR) 40 MG tablet; Take 1 tablet by mouth Daily.  Dispense: 90 tablet; Refill: 3        Follow Up   No follow-ups on file.  Patient was given instructions and counseling regarding his condition or for health maintenance advice. Please see specific information pulled into the AVS if appropriate.     Transcribed from ambient dictation for Geo Smith Sr, MD by Laure Cobian.  01/06/23   16:18 EST    Patient or patient representative verbalized consent to the visit recording.  I have personally performed the services described in this document as transcribed by the above individual, and it is both accurate and complete.

## 2023-01-19 NOTE — ASSESSMENT & PLAN NOTE
Coronary artery disease is unchanged.  Continue current treatment regimen.  Dietary sodium restriction.  Weight loss.  Regular aerobic exercise.  Continue current medications.  Cardiac status will be reassessed in 6 months.

## 2023-02-10 RX ORDER — CLOPIDOGREL BISULFATE 75 MG/1
75 TABLET ORAL DAILY
Qty: 90 TABLET | Refills: 1 | Status: SHIPPED | OUTPATIENT
Start: 2023-02-10

## 2023-02-10 NOTE — TELEPHONE ENCOUNTER
Pt is requesting a refill for his clopidogrel (PLAVIX) 75 MG tablet medication. Pt is requesting it be sent to [facility] pharmacy on file.    Please advise

## 2023-02-10 NOTE — TELEPHONE ENCOUNTER
Rx Refill Note  Requested Prescriptions     Pending Prescriptions Disp Refills   • clopidogrel (PLAVIX) 75 MG tablet 90 tablet 1     Sig: Take 1 tablet by mouth Daily.      Last office visit with prescribing clinician: 1/6/2023   Last telemedicine visit with prescribing clinician: 2/17/2023   Next office visit with prescribing clinician: 2/17/2023                         Would you like a call back once the refill request has been completed: [] Yes [] No    If the office needs to give you a call back, can they leave a voicemail: [] Yes [] No    Licha Foster MA  02/10/23, 09:10 EST

## 2023-02-14 ENCOUNTER — TELEPHONE (OUTPATIENT)
Dept: FAMILY MEDICINE CLINIC | Facility: CLINIC | Age: 68
End: 2023-02-14
Payer: MEDICARE

## 2023-02-17 ENCOUNTER — OFFICE VISIT (OUTPATIENT)
Dept: FAMILY MEDICINE CLINIC | Facility: CLINIC | Age: 68
End: 2023-02-17
Payer: MEDICARE

## 2023-02-17 VITALS
TEMPERATURE: 97.5 F | HEIGHT: 72 IN | DIASTOLIC BLOOD PRESSURE: 70 MMHG | BODY MASS INDEX: 24.11 KG/M2 | RESPIRATION RATE: 18 BRPM | OXYGEN SATURATION: 98 % | SYSTOLIC BLOOD PRESSURE: 132 MMHG | WEIGHT: 178 LBS | HEART RATE: 56 BPM

## 2023-02-17 DIAGNOSIS — Z00.00 MEDICARE ANNUAL WELLNESS VISIT, SUBSEQUENT: Primary | ICD-10-CM

## 2023-02-17 DIAGNOSIS — I10 ESSENTIAL HYPERTENSION: ICD-10-CM

## 2023-02-17 PROCEDURE — 1170F FXNL STATUS ASSESSED: CPT | Performed by: FAMILY MEDICINE

## 2023-02-17 PROCEDURE — G0439 PPPS, SUBSEQ VISIT: HCPCS | Performed by: FAMILY MEDICINE

## 2023-02-17 PROCEDURE — 99213 OFFICE O/P EST LOW 20 MIN: CPT | Performed by: FAMILY MEDICINE

## 2023-02-17 PROCEDURE — 1160F RVW MEDS BY RX/DR IN RCRD: CPT | Performed by: FAMILY MEDICINE

## 2023-02-17 NOTE — PROGRESS NOTES
QUICK REFERENCE INFORMATION:  The ABCs of the Annual Wellness Visit    Subsequent Medicare Wellness Visit     HEALTH RISK ASSESSMENT    : 1955    Recent Hospitalizations:  No hospitalization(s) within the last year..  ccc    Current Medical Providers:  Patient Care Team:  Geo Smith Sr., MD as PCP - General (Family Medicine)  Lizzy Wong MD as Consulting Physician (Cardiology)  Jaswinder Ruelas DO (Vascular Surgery)    Smoking Status:  Social History     Tobacco Use   Smoking Status Never   Smokeless Tobacco Never   Tobacco Comments    daily caffeine use       Alcohol Consumption:  Social History     Substance and Sexual Activity   Alcohol Use Yes   • Alcohol/week: 6.0 standard drinks   • Types: 6 Cans of beer per week       Depression Screen:   PHQ-2/PHQ-9 Depression Screening 2023   Retired PHQ-9 Total Score -   Retired Total Score -   Little Interest or Pleasure in Doing Things 0-->not at all   Feeling Down, Depressed or Hopeless 0-->not at all   PHQ-9: Brief Depression Severity Measure Score 0       Health Habits and Functional and Cognitive Screening:  Functional & Cognitive Status 2023   Do you have difficulty preparing food and eating? No   Do you have difficulty bathing yourself, getting dressed or grooming yourself? No   Do you have difficulty using the toilet? No   Do you have difficulty moving around from place to place? No   Do you have trouble with steps or getting out of a bed or a chair? 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  No   Are you deaf or do you have serious difficulty hearing?  No   Do you need help with transportation? No   Do you need help shopping? No   Do you need help preparing meals?  No   Do you need help with housework?  No   Do you need help  with laundry? No   Do you need help taking your medications? No   Do you need help managing money? Yes   Do you ever drive or ride in a car without wearing a seat belt? No   Have you felt unusual stress, anger or loneliness in the last month? No   Who do you live with? Child   If you need help, do you have trouble finding someone available to you? No   Have you been bothered in the last four weeks by sexual problems? -   Do you have difficulty concentrating, remembering or making decisions? No       Does the patient have evidence of cognitive impairment? No    Mini-Mental State Examination (MMSE)        Instructions: Ask the questions in the order listed. Score one point for each correct response within each question or activity.      Maximum Score  Patient’s Score  Questions    5  5  “What is the year?  Season?  Date?  Day of the week?  Month?”    5  5  “Where are we now: State?  County?  Town/city?  Hospital?  Floor?”    3  3  The examiner names three unrelated objects clearly and slowly, then asks the patient to name all three of them. The patient’s response is used for scoring. The examiner repeats them until patient learns all of them, if possible. Number of trials: ___________    5  5  “I would like you to count backward from 100 by sevens.” (93, 86, 79, 72, 65, …) Stop after five answers.   Alternative: “Spell WORLD backwards.” ([id number])    3   3 “Earlier I told you the names of three things. Can you tell me what those were?”    2    2  Show the patient two simple objects, such as a wristwatch and a pencil, and ask the patient to name them.    1   1 “Repeat the phrase: ‘No ifs, ands, or buts.’”    3  3  “Take the paper in your right hand, fold it in half, and put it on the floor.”   (The examiner gives the patient a piece of blank paper.)    1  1  “Please read this and do what it says.” (Written instruction is “Close your eyes.”)    1  1  “Make up and write a sentence about anything.” (This sentence must  contain a noun and a verb.)    1   1 “Please copy this picture.” (The examiner gives the patient a blank piece of paper and asks him/her to draw the symbol below. All 10 angles must be present and two must intersect.)             30   30 TOTAL          Aspirin use counseling: Taking ASA appropriately as indicated    Recent Lab Results:     Lab Results   Component Value Date    HGBA1C 5.6 08/09/2022     Lab Results   Component Value Date    TRIG 98 02/10/2023    HDL 43 02/10/2023    VLDL 18 02/10/2023    LDLHDL 2.66 06/29/2018       Age-appropriate Screening Schedule:  Refer to the list below for future screening recommendations based on patient's age, sex and/or medical conditions. Orders for these recommended tests are listed in the plan section. The patient has been provided with a written plan.    Health Maintenance   Topic Date Due   • ZOSTER VACCINE (1 of 2) Never done   • INFLUENZA VACCINE  08/01/2022   • LIPID PANEL  02/10/2024   • TDAP/TD VACCINES (2 - Td or Tdap) 08/08/2028        Subjective   History of Present Illness:  Blaine Jimenez is a 67 y.o. male who presents for an Annual Wellness Visit.  History of Present Illness  The patient is a 67-year-old male who presents for a Medicare wellness visit.    Health maintenance  The patient states he had blood work done last week and is inquiring about results. He denies being hospitalized within the last year or any cognitive impairment. The patient takes a baby aspirin daily. In comparison to last year, he rates his physical health as better since his blood pressure has been lowering. He states his mental health is the same and he does not have a living will.       Allergies:  No Known Allergies    Social History:  Social History     Socioeconomic History   • Marital status: Single   Tobacco Use   • Smoking status: Never   • Smokeless tobacco: Never   • Tobacco comments:     daily caffeine use   Substance and Sexual Activity   • Alcohol use: Yes      Alcohol/week: 6.0 standard drinks     Types: 6 Cans of beer per week   • Drug use: Never   • Sexual activity: Not Currently       Family History:  Family History   Problem Relation Age of Onset   • Heart disease Father    • Anxiety disorder Father    • Heart disease Brother    • Anxiety disorder Brother    • Anxiety disorder Sister        Past Medical History :  Active Ambulatory Problems     Diagnosis Date Noted   • Spasm of back muscles 06/27/2016   • Atherosclerosis of coronary artery 06/27/2016   • Generalized anxiety disorder 06/27/2016   • Hypercholesterolemia 06/27/2016   • Essential hypertension 06/27/2016   • Health care maintenance 06/20/2018   • Heart murmur 06/20/2018   • Bilateral carotid artery stenosis 05/21/2019   • Prediabetes 02/15/2022   • Medicare annual wellness visit, subsequent 02/17/2023     Resolved Ambulatory Problems     Diagnosis Date Noted   • Acute myocardial infarction (HCC) 06/27/2016   • Controlled substance agreement signed 06/27/2016   • Bruit of right carotid artery 06/20/2018     Past Medical History:   Diagnosis Date   • Anxiety 3 years ?   • Atherosclerosis of native coronary artery of native heart without angina pectoris    • Erectile dysfunction Soon after medication for blood pressure ect   • Hyperlipidemia    • Hypertension    • Myocardial infarction (HCC) ?       Medication List:  Outpatient Encounter Medications as of 2/17/2023   Medication Sig Dispense Refill   • amLODIPine (NORVASC) 10 MG tablet Take 1 tablet by mouth Daily. 90 tablet 3   • aspirin 81 MG chewable tablet Chew.     • atorvastatin (LIPITOR) 40 MG tablet Take 1 tablet by mouth Daily. 90 tablet 3   • carvedilol (COREG) 12.5 MG tablet Take 1 tablet by mouth 2 (Two) Times a Day. 180 tablet 3   • clopidogrel (PLAVIX) 75 MG tablet Take 1 tablet by mouth Daily. 90 tablet 1   • hydrALAZINE (APRESOLINE) 50 MG tablet Take 1 tablet by mouth 3 (Three) Times a Day. 270 tablet 3   • isosorbide mononitrate (IMDUR) 30 MG  "24 hr tablet Take 1 tablet by mouth Daily. 90 tablet 3     No facility-administered encounter medications on file as of 2/17/2023.     Reviewed use of high risk medication in the elderly: yes  Reviewed for potential of harmful drug interactions in the elderly: yes    Past Surgical History:  Past Surgical History:   Procedure Laterality Date   • CARDIAC CATHETERIZATION  10/09/2013    [facility], Dr. David Harper        The following portions of the patient's history were reviewed and updated as appropriate: allergies, current medications, past family history, past medical history, past social history, past surgical history and problem list.    Review Of Systems:  Review of Systems   Constitutional: Negative for activity change, appetite change and fatigue.   HENT: Negative for congestion, postnasal drip, sinus pressure and sore throat.    Eyes: Negative for blurred vision and itching.   Respiratory: Negative for cough, shortness of breath and wheezing.    Cardiovascular: Negative for chest pain.   Gastrointestinal: Negative for abdominal pain, constipation, nausea, vomiting and GERD.   Endocrine: Negative for cold intolerance and heat intolerance.   Genitourinary: Negative for difficulty urinating, dysuria and urinary incontinence.   Musculoskeletal: Negative for back pain, joint swelling and neck pain.   Skin: Negative for color change and rash.   Neurological: Negative for dizziness, speech difficulty, weakness and memory problem.   Psychiatric/Behavioral: Negative for behavioral problems, decreased concentration, suicidal ideas and depressed mood.     I have reviewed and confirmed the accuracy of the ROS as documented by the MA/LPN/RN Geo Smith Sr, MD.    Objective     Physical Exam:  Vital Signs:  Visit Vitals  /70   Pulse 56   Temp 97.5 °F (36.4 °C)   Resp 18   Ht 182.9 cm (72\")   Wt 80.7 kg (178 lb)   SpO2 98%   BMI 24.14 kg/m²     BMI is within normal parameters. No other follow-up for BMI " required.     Physical Exam  Vitals reviewed.   Constitutional:       Appearance: He is well-developed.   HENT:      Head: Normocephalic and atraumatic.      Nose: Nose normal.   Eyes:      General: Lids are normal.      Conjunctiva/sclera: Conjunctivae normal.      Pupils: Pupils are equal, round, and reactive to light.   Cardiovascular:      Rate and Rhythm: Normal rate and regular rhythm.      Pulses: Normal pulses.      Heart sounds: Normal heart sounds.   Pulmonary:      Effort: Pulmonary effort is normal. No respiratory distress.      Breath sounds: Normal breath sounds.   Abdominal:      General: Bowel sounds are normal.      Palpations: Abdomen is soft.   Musculoskeletal:         General: Normal range of motion.      Cervical back: Normal range of motion and neck supple.   Skin:     General: Skin is warm and dry.      Capillary Refill: Capillary refill takes less than 2 seconds.   Neurological:      Mental Status: He is alert and oriented to person, place, and time.   Psychiatric:         Behavior: Behavior normal.         Thought Content: Thought content normal.         Judgment: Judgment normal.           Assessment & Plan   Assessment and Plan:  Patient Self-Management and Personalized Health Advice  The patient has been provided with information about: designing advance directives and preventive services including:   · Annual Wellness Visit (AWV).    Advance Care Planning:  ACP discussion was held with the patient during this visit. Patient does not have an advance directive, information provided.  Identification of Risk Factors:  Risk factors include: Advance Directive Discussion.    Diagnoses and all orders for this visit:    1. Medicare annual wellness visit, subsequent (Primary)  Comments:  - Laboratory test results reviewed with patient.  Assessment & Plan:  Medicare wellness completed.  Advanced directives discussed with patient.  Patient scored 30/30 on Mini-Mental exam      2. Essential  hypertension  Assessment & Plan:  Hypertension is unchanged.  Continue current treatment regimen.  Dietary sodium restriction.  Weight loss.  Regular aerobic exercise.  Continue current medications.  Blood pressure will be reassessed at the next regular appointment.      Return in about 6 months for follow-up on hypertension.    Follow Up:  No follow-ups on file.     An After Visit Summary and PPPS with all of these plans were given to the patient.             Transcribed from ambient dictation for Geo Smith Sr, MD by Jane Smallwood.  02/17/23   10:35 EST    Patient or patient representative verbalized consent to the visit recording.  I have personally performed the services described in this document as transcribed by the above individual, and it is both accurate and complete.

## 2023-02-17 NOTE — ASSESSMENT & PLAN NOTE
Medicare wellness completed.  Advanced directives discussed with patient.  Patient scored 30/30 on Mini-Mental exam

## 2023-03-14 ENCOUNTER — OFFICE VISIT (OUTPATIENT)
Dept: CARDIOLOGY | Facility: CLINIC | Age: 68
End: 2023-03-14
Payer: MEDICARE

## 2023-03-14 VITALS
HEIGHT: 72 IN | WEIGHT: 175 LBS | SYSTOLIC BLOOD PRESSURE: 136 MMHG | BODY MASS INDEX: 23.7 KG/M2 | HEART RATE: 61 BPM | DIASTOLIC BLOOD PRESSURE: 80 MMHG

## 2023-03-14 DIAGNOSIS — E78.00 HYPERCHOLESTEROLEMIA: Chronic | ICD-10-CM

## 2023-03-14 DIAGNOSIS — I10 ESSENTIAL HYPERTENSION: ICD-10-CM

## 2023-03-14 DIAGNOSIS — I25.10 ATHEROSCLEROSIS OF NATIVE CORONARY ARTERY OF NATIVE HEART WITHOUT ANGINA PECTORIS: Primary | Chronic | ICD-10-CM

## 2023-03-14 DIAGNOSIS — I65.23 BILATERAL CAROTID ARTERY STENOSIS: Chronic | ICD-10-CM

## 2023-03-14 PROCEDURE — 1160F RVW MEDS BY RX/DR IN RCRD: CPT | Performed by: NURSE PRACTITIONER

## 2023-03-14 PROCEDURE — 93000 ELECTROCARDIOGRAM COMPLETE: CPT | Performed by: NURSE PRACTITIONER

## 2023-03-14 PROCEDURE — 3075F SYST BP GE 130 - 139MM HG: CPT | Performed by: NURSE PRACTITIONER

## 2023-03-14 PROCEDURE — 3079F DIAST BP 80-89 MM HG: CPT | Performed by: NURSE PRACTITIONER

## 2023-03-14 PROCEDURE — 99214 OFFICE O/P EST MOD 30 MIN: CPT | Performed by: NURSE PRACTITIONER

## 2023-03-14 PROCEDURE — 1159F MED LIST DOCD IN RCRD: CPT | Performed by: NURSE PRACTITIONER

## 2023-03-14 NOTE — PROGRESS NOTES
Subjective:     Encounter Date:03/14/2023      Patient ID: Blaine Jimenez is a 67 y.o. male.    Chief Complaint:follow up CAD  History of Present Illness  This is a 68 y/o man who follows with Dr. Wong and is new to me today. He has a pmhx of coronary artery disease, hypertension, hyperlipidemia, and peripheral vascular disease with left carotid artery occlusion. Dr. Wong began seeing the patient in 2018 when he presented for evaluation of an abnormal echocardiogram. Echo showed normal LV systolic function with an EF of 56%, hypokinesis of his basal inferior, inferolateral, mid inferolateral wall, normal diastolic function and mild mitral regurgitation. Carotid ultrasound showed moderate stenosis of his proximal right ICA and occlusion of proximal left ICA. He had previously had a cardiac catheterization with Dr. Harper which showed normal left main, nonobstructive disease of his LAD, severe ostial disease of small first diagonal branch, 90% ostial stenosis of a large ramus intermedius artery, moderate nonobstructive disease of the LCx, and severe distal stenosis of the right coronary artery with well-formed left to right collateral filling of the distal vessel.  His left ventricular angiogram showed akinesis of his basal to mid inferior wall. He was started on medical management.     At his follow ups, his blood pressure was a problem requiring multiple adjustments of his medications. At his last follow up in August 2020, it seemed to be better controlled. No changes were made. He has been lost to follow up since. He is here today and reports that he has been feeling well. His BP is typically around 130/80. 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 He denies any exertional chest pain or shortness of breath. He denies any palpitations, dizziness or syncope. He denies any swelling in his lower extremities, orthopnea or PND. Overall, he feels good.    I have reviewed  and updated as appropriate allergies, current medications, past family history, past medical history, past surgical history and problem list.    Review of Systems   Constitutional: Negative for fever, malaise/fatigue, weight gain and weight loss.   HENT: Negative for congestion, hoarse voice and sore throat.    Eyes: Negative for blurred vision and double vision.   Cardiovascular: Negative for chest pain, dyspnea on exertion, leg swelling, orthopnea, palpitations and syncope.   Respiratory: Negative for cough, shortness of breath and wheezing.    Gastrointestinal: Negative for abdominal pain, hematemesis, hematochezia and melena.   Genitourinary: Negative for dysuria and hematuria.   Neurological: Negative for dizziness, headaches, light-headedness and numbness.   Psychiatric/Behavioral: Negative for depression. The patient is not nervous/anxious.          Current Outpatient Medications:   •  amLODIPine (NORVASC) 10 MG tablet, Take 1 tablet by mouth Daily., Disp: 90 tablet, Rfl: 3  •  aspirin 81 MG chewable tablet, Chew., Disp: , Rfl:   •  atorvastatin (LIPITOR) 40 MG tablet, Take 1 tablet by mouth Daily., Disp: 90 tablet, Rfl: 3  •  carvedilol (COREG) 12.5 MG tablet, Take 1 tablet by mouth 2 (Two) Times a Day., Disp: 180 tablet, Rfl: 3  •  clopidogrel (PLAVIX) 75 MG tablet, Take 1 tablet by mouth Daily., Disp: 90 tablet, Rfl: 1  •  hydrALAZINE (APRESOLINE) 50 MG tablet, Take 1 tablet by mouth 3 (Three) Times a Day., Disp: 270 tablet, Rfl: 3  •  isosorbide mononitrate (IMDUR) 30 MG 24 hr tablet, Take 1 tablet by mouth Daily., Disp: 90 tablet, Rfl: 3    Past Medical History:   Diagnosis Date   • Anxiety 3 years ?   • Atherosclerosis of native coronary artery of native heart without angina pectoris    • Bruit of right carotid artery    • Erectile dysfunction Soon after medication for blood pressure ect   • Heart murmur    • Hyperlipidemia    • Hypertension    • Myocardial infarction (HCC) ?       Past Surgical  "History:   Procedure Laterality Date   • [facility]       Family History   Problem Relation Age of Onset   • Heart disease Father    • Anxiety disorder Father    • Heart disease Brother    • Anxiety disorder Brother    • Anxiety disorder Sister        Social History     Tobacco Use   • Smoking status: Never   • Smokeless tobacco: Never   • Tobacco comments:     daily caffeine use   Substance Use Topics   • Alcohol use: Yes     Alcohol/week: 6.0 standard drinks     Types: 6 Cans of beer per week   • Drug use: Never         ECG 12 Lead    Date/Time: 3/14/2023 3:31 PM  Performed by: Laura Tsang APRN  Authorized by: Laura Tsang APRN   Comparison: compared with previous ECG from 8/20/2020  Similar to previous ECG  Rhythm: sinus rhythm  Other findings: left ventricular hypertrophy  Comments: No significant change from previous EKG               Objective:     Visit Vitals  /80   Pulse 61   Ht 182.9 cm (72\")   Wt 79.4 kg (175 lb)   BMI 23.73 kg/m²             Physical Exam  Constitutional:       Appearance: Normal appearance. He is normal weight.   HENT:      Head: Normocephalic.   Neck:      Vascular: No carotid bruit.   Cardiovascular:      Rate and Rhythm: Normal rate and regular rhythm.      Chest Wall: PMI is not displaced.      Pulses: Normal pulses.           Radial pulses are 2+ on the right side and 2+ on the left side.        Posterior tibial pulses are 2+ on the right side and 2+ on the left side.      Heart sounds: Normal heart sounds. No murmur heard.    No friction rub. No gallop.   Pulmonary:      Effort: Pulmonary effort is normal.      Breath sounds: Normal breath sounds.   Abdominal:      General: Bowel sounds are normal. There is no distension.      Palpations: Abdomen is soft.   Musculoskeletal:      Right lower leg: No edema.      Left lower leg: No edema.   Skin:     General: Skin is warm and dry.      Capillary Refill: " Capillary refill takes less than 2 seconds.   Neurological:      Mental Status: He is alert and oriented to person, place, and time.   Psychiatric:         Mood and Affect: Mood normal.         Behavior: Behavior normal.         Thought Content: Thought content normal.          Lab Review:   Lipid Panel    Lipid Panel 8/9/22 2/10/23   Total Cholesterol 134 153   Triglycerides 119 98   HDL Cholesterol 33 (A) 43   VLDL Cholesterol 22 18   LDL Cholesterol  79 92   (A) Abnormal value       Comments are available for some flowsheets but are not being displayed.               Cardiac Procedures:   1.     Assessment:         Diagnoses and all orders for this visit:    1. Atherosclerosis of native coronary artery of native heart without angina pectoris (Primary)    2. Hypercholesterolemia    3. Bilateral carotid artery stenosis    4. Essential hypertension    Other orders  -     ECG 12 Lead            Plan:       1. Coronary artery disease: EKG is stable with no acute ischemic changes. No anginal symptoms reported. On aspirin, clopidogrel, and statin. Continue with current medical management.  2. HTN: blood pressure mildly elevated. Reports good BP control at home and at recent MD appointments. Will monitor and notify us if it remains over 130/80.  3. HLD: on statin therapy. Goal LDL < 70. Managed by PCP  4. Bilateral carotid artery stenosis: asymptomatic. Continue aspirin and statin.    Thank you for allowing me to participate in this patient's care. Please call with any questions or concerns. Mr. Jimenez will follow up with Dr. Wong in 1 year.          Your medication list          Accurate as of March 14, 2023  4:09 PM. If you have any questions, ask your nurse or doctor.            CONTINUE taking these medications      Instructions Last Dose Given Next Dose Due   amLODIPine 10 MG tablet  Commonly known as: NORVASC      Take 1 tablet by mouth Daily.       aspirin 81 MG chewable tablet      Chew.       atorvastatin 40  MG tablet  Commonly known as: LIPITOR      Take 1 tablet by mouth Daily.       carvedilol 12.5 MG tablet  Commonly known as: COREG      Take 1 tablet by mouth 2 (Two) Times a Day.       clopidogrel 75 MG tablet  Commonly known as: PLAVIX      Take 1 tablet by mouth Daily.       hydrALAZINE 50 MG tablet  Commonly known as: APRESOLINE      Take 1 tablet by mouth 3 (Three) Times a Day.       isosorbide mononitrate 30 MG 24 hr tablet  Commonly known as: IMDUR      Take 1 tablet by mouth Daily.                AILYN Fajardo  03/14/23  3:02 PM EDT

## 2023-09-27 RX ORDER — CLOPIDOGREL BISULFATE 75 MG/1
TABLET ORAL
Qty: 90 TABLET | Refills: 1 | Status: SHIPPED | OUTPATIENT
Start: 2023-09-27

## 2024-01-08 DIAGNOSIS — E78.00 HYPERCHOLESTEROLEMIA: ICD-10-CM

## 2024-01-08 DIAGNOSIS — I10 ESSENTIAL HYPERTENSION: ICD-10-CM

## 2024-01-09 RX ORDER — AMLODIPINE BESYLATE 10 MG/1
10 TABLET ORAL DAILY
Qty: 90 TABLET | Refills: 3 | Status: SHIPPED | OUTPATIENT
Start: 2024-01-09

## 2024-01-09 RX ORDER — ATORVASTATIN CALCIUM 40 MG/1
40 TABLET, FILM COATED ORAL DAILY
Qty: 90 TABLET | Refills: 3 | Status: SHIPPED | OUTPATIENT
Start: 2024-01-09

## 2024-01-17 DIAGNOSIS — I10 ESSENTIAL HYPERTENSION: ICD-10-CM

## 2024-01-17 RX ORDER — HYDRALAZINE HYDROCHLORIDE 50 MG/1
50 TABLET, FILM COATED ORAL 3 TIMES DAILY
Qty: 270 TABLET | Refills: 3 | Status: SHIPPED | OUTPATIENT
Start: 2024-01-17

## 2024-02-22 DIAGNOSIS — I10 ESSENTIAL HYPERTENSION: ICD-10-CM

## 2024-02-22 DIAGNOSIS — I25.10 ATHEROSCLEROSIS OF NATIVE CORONARY ARTERY OF NATIVE HEART WITHOUT ANGINA PECTORIS: ICD-10-CM

## 2024-02-23 NOTE — TELEPHONE ENCOUNTER
Rx Refill Note  Requested Prescriptions     Pending Prescriptions Disp Refills    carvedilol (COREG) 12.5 MG tablet [Pharmacy Med Name: CARVEDILOL 12.5 MG TABLET] 180 tablet 3     Sig: TAKE ONE TABLET BY MOUTH TWICE A DAY      Last office visit with prescribing clinician: 2/17/2023   Last telemedicine visit with prescribing clinician: Visit date not found   Next office visit with prescribing clinician: Visit date not found                         Would you like a call back once the refill request has been completed: [] Yes [] No    If the office needs to give you a call back, can they leave a voicemail: [] Yes [] No    Licha Foster MA  02/23/24, 11:19 EST

## 2024-02-24 RX ORDER — CARVEDILOL 12.5 MG/1
12.5 TABLET ORAL 2 TIMES DAILY
Qty: 180 TABLET | Refills: 3 | Status: SHIPPED | OUTPATIENT
Start: 2024-02-24

## 2024-03-07 DIAGNOSIS — I10 ESSENTIAL HYPERTENSION: ICD-10-CM

## 2024-03-07 RX ORDER — ISOSORBIDE MONONITRATE 30 MG/1
30 TABLET, EXTENDED RELEASE ORAL DAILY
Qty: 30 TABLET | Refills: 0 | Status: SHIPPED | OUTPATIENT
Start: 2024-03-07

## 2024-03-07 NOTE — TELEPHONE ENCOUNTER
Rx Refill Note  Requested Prescriptions     Pending Prescriptions Disp Refills    isosorbide mononitrate (IMDUR) 30 MG 24 hr tablet [Pharmacy Med Name: ISOSORBIDE MONONIT ER 30 MG TB] 90 tablet 3     Sig: TAKE ONE TABLET BY MOUTH DAILY      Last office visit with prescribing clinician: 2/17/2023   Last telemedicine visit with prescribing clinician: Visit date not found   Next office visit with prescribing clinician: Visit date not found                         Would you like a call back once the refill request has been completed: [] Yes [] No    If the office needs to give you a call back, can they leave a voicemail: [] Yes [] No    Kenn Hester MA  03/07/24, 13:10 EST

## 2024-04-05 DIAGNOSIS — I10 ESSENTIAL HYPERTENSION: ICD-10-CM

## 2024-04-05 RX ORDER — ISOSORBIDE MONONITRATE 30 MG/1
30 TABLET, EXTENDED RELEASE ORAL DAILY
Qty: 30 TABLET | Refills: 0 | OUTPATIENT
Start: 2024-04-05

## 2024-04-05 NOTE — TELEPHONE ENCOUNTER
Rx Refill Note  Requested Prescriptions     Pending Prescriptions Disp Refills    isosorbide mononitrate (IMDUR) 30 MG 24 hr tablet [Pharmacy Med Name: ISOSORBIDE MONONIT ER 30 MG TB] 30 tablet 0     Sig: TAKE 1 TABLET BY MOUTH DAILY      Last office visit with prescribing clinician: 2/17/2023   Last telemedicine visit with prescribing clinician: Visit date not found   Next office visit with prescribing clinician: Visit date not found                         Would you like a call back once the refill request has been completed: [] Yes [] No    If the office needs to give you a call back, can they leave a voicemail: [] Yes [] No    Licha Foster MA  04/05/24, 10:19 EDT

## 2024-04-09 NOTE — TELEPHONE ENCOUNTER
Called to schedule ov for med refill no answer lvm for pt to call when available to schedule appt    Hub to relay and schedule pt

## 2024-04-23 RX ORDER — CLOPIDOGREL BISULFATE 75 MG/1
75 TABLET ORAL DAILY
Qty: 30 TABLET | Refills: 0 | Status: SHIPPED | OUTPATIENT
Start: 2024-04-23

## 2024-05-13 ENCOUNTER — OFFICE VISIT (OUTPATIENT)
Dept: FAMILY MEDICINE CLINIC | Facility: CLINIC | Age: 69
End: 2024-05-13
Payer: MEDICARE

## 2024-05-13 VITALS
HEART RATE: 72 BPM | SYSTOLIC BLOOD PRESSURE: 142 MMHG | BODY MASS INDEX: 24.65 KG/M2 | DIASTOLIC BLOOD PRESSURE: 70 MMHG | TEMPERATURE: 97.7 F | RESPIRATION RATE: 18 BRPM | HEIGHT: 72 IN | OXYGEN SATURATION: 98 % | WEIGHT: 182 LBS

## 2024-05-13 DIAGNOSIS — Z00.00 MEDICARE ANNUAL WELLNESS VISIT, SUBSEQUENT: Primary | ICD-10-CM

## 2024-05-13 DIAGNOSIS — I10 ESSENTIAL HYPERTENSION: ICD-10-CM

## 2024-05-13 DIAGNOSIS — Z12.5 SCREENING FOR PROSTATE CANCER: ICD-10-CM

## 2024-05-13 PROCEDURE — 3078F DIAST BP <80 MM HG: CPT | Performed by: FAMILY MEDICINE

## 2024-05-13 PROCEDURE — 1170F FXNL STATUS ASSESSED: CPT | Performed by: FAMILY MEDICINE

## 2024-05-13 PROCEDURE — G0439 PPPS, SUBSEQ VISIT: HCPCS | Performed by: FAMILY MEDICINE

## 2024-05-13 PROCEDURE — 1159F MED LIST DOCD IN RCRD: CPT | Performed by: FAMILY MEDICINE

## 2024-05-13 PROCEDURE — 3077F SYST BP >= 140 MM HG: CPT | Performed by: FAMILY MEDICINE

## 2024-05-13 PROCEDURE — 1126F AMNT PAIN NOTED NONE PRSNT: CPT | Performed by: FAMILY MEDICINE

## 2024-05-13 PROCEDURE — 1160F RVW MEDS BY RX/DR IN RCRD: CPT | Performed by: FAMILY MEDICINE

## 2024-05-13 NOTE — ASSESSMENT & PLAN NOTE
Medicare wellness completed.  Discussed advanced directives with patient.  Performed the Mini-Mental exam and patient scored 29/30.

## 2024-05-13 NOTE — PROGRESS NOTES
The ABCs of the Annual Wellness Visit  Medicare Annual Wellness Visit    Subjective     Blaine Jimenez is a 68 y.o. male who presents for a Medicare Wellness Visit.    The following portions of the patient's history were reviewed and   updated as appropriate: allergies, current medications, past family history, past medical history, past social history, past surgical history, and problem list.     Compared to one year ago, the patient feels his physical   health is the same.    Compared to one year ago, the patient feels his mental   health is the same.    Recent Hospitalizations:  He was not admitted to the hospital during the last year.       Current Medical Providers:  Patient Care Team:  Geo Smith Sr., MD as PCP - General (Family Medicine)  Lizzy Wong MD as Consulting Physician (Cardiology)  Jaswinder Ruelas DO (Vascular Surgery)    Outpatient Medications Prior to Visit   Medication Sig Dispense Refill    amLODIPine (NORVASC) 10 MG tablet TAKE ONE TABLET BY MOUTH DAILY 90 tablet 3    aspirin 81 MG chewable tablet Chew.      atorvastatin (LIPITOR) 40 MG tablet TAKE ONE TABLET BY MOUTH DAILY 90 tablet 3    carvedilol (COREG) 12.5 MG tablet TAKE ONE TABLET BY MOUTH TWICE A  tablet 3    clopidogrel (PLAVIX) 75 MG tablet TAKE 1 TABLET BY MOUTH DAILY 30 tablet 0    hydrALAZINE (APRESOLINE) 50 MG tablet TAKE ONE TABLET BY MOUTH THREE TIMES A  tablet 3    isosorbide mononitrate (IMDUR) 30 MG 24 hr tablet TAKE ONE TABLET BY MOUTH DAILY 30 tablet 0     No facility-administered medications prior to visit.       No opioid medication identified on active medication list. I have reviewed chart for other potential  high risk medication/s and harmful drug interactions in the elderly.        Aspirin is on active medication list. Aspirin use is indicated based on review of current medical condition/s. Pros and cons of this therapy have been discussed today. Benefits of this medication outweigh  "potential harm.  Patient has been encouraged to continue taking this medication.  .      Patient Active Problem List   Diagnosis    Spasm of back muscles    Atherosclerosis of coronary artery    Generalized anxiety disorder    Hypercholesterolemia    Essential hypertension    Health care maintenance    Heart murmur    Bilateral carotid artery stenosis    Prediabetes    Medicare annual wellness visit, subsequent     Advance Care Planning   Advance Care Planning     Advance Directive is not on file.  ACP discussion was held with the patient during this visit. Patient does not have an advance directive, information provided.       Objective    Vitals:    05/13/24 0843   BP: 142/70   Pulse: 72   Resp: 18   Temp: 97.7 °F (36.5 °C)   SpO2: 98%   Weight: 82.6 kg (182 lb)   Height: 182.9 cm (72\")   PainSc: 0-No pain     Estimated body mass index is 24.68 kg/m² as calculated from the following:    Height as of this encounter: 182.9 cm (72\").    Weight as of this encounter: 82.6 kg (182 lb).    BMI is within normal parameters. No other follow-up for BMI required.      Does the patient have evidence of cognitive impairment?   No    Mini-Mental State Examination (MMSE)        Instructions: Ask the questions in the order listed. Score one point for each correct response within each question or activity.      Maximum Score  Patient’s Score  Questions    5  5  “What is the year?  Season?  Date?  Day of the week?  Month?”    5  5  “Where are we now: State?  County?  Town/city?  Hospital?  Floor?”    3  3  The examiner names three unrelated objects clearly and slowly, then asks the patient to name all three of them. The patient’s response is used for scoring. The examiner repeats them until patient learns all of them, if possible. Number of trials: ___________    5   5 “I would like you to count backward from 100 by sevens.” (93, 86, 79, 72, 65, …) Stop after five answers.   Alternative: “Spell WORLD backwards.” ([id number])    3   2 " “Earlier I told you the names of three things. Can you tell me what those were?”    2  2  Show the patient two simple objects, such as a wristwatch and a pencil, and ask the patient to name them.    1   1 “Repeat the phrase: ‘No ifs, ands, or buts.’”    3   3 “Take the paper in your right hand, fold it in half, and put it on the floor.”   (The examiner gives the patient a piece of blank paper.)    1   1 “Please read this and do what it says.” (Written instruction is “Close your eyes.”)    1  1  “Make up and write a sentence about anything.” (This sentence must contain a noun and a verb.)    1  1  “Please copy this picture.” (The examiner gives the patient a blank piece of paper and asks him/her to draw the symbol below. All 10 angles must be present and two must intersect.)             30  29  TOTAL              HEALTH RISK ASSESSMENT    Smoking Status:  Social History     Tobacco Use   Smoking Status Never   Smokeless Tobacco Never   Tobacco Comments    daily caffeine use     Alcohol Consumption:  Social History     Substance and Sexual Activity   Alcohol Use Yes    Alcohol/week: 6.0 standard drinks of alcohol    Types: 6 Cans of beer per week     Fall Risk Screen:    SHANEADI Fall Risk Assessment was completed, and patient is at LOW risk for falls.Assessment completed on:2024    Depression Screen:       2024     8:44 AM   PHQ-2/PHQ-9 Depression Screening   Little Interest or Pleasure in Doing Things 0-->not at all   Feeling Down, Depressed or Hopeless 0-->not at all   PHQ-9: Brief Depression Severity Measure Score 0       Health Habits and Functional and Cognitive Screenin/13/2024     8:00 AM   Functional & Cognitive Status   Do you have difficulty preparing food and eating? No   Do you have difficulty bathing yourself, getting dressed or grooming yourself? No   Do you have difficulty using the toilet? No   Do you have difficulty moving around from place to place? No   Do you have trouble with  steps or getting out of a bed or a chair? No   Current Diet Well Balanced Diet   Dental Exam Not up to date   Eye Exam Not up to date   Exercise (times per week) Other   Current Exercises Include Yard Work        Exercise Comment construction   Do you need help using the phone?  No   Are you deaf or do you have serious difficulty hearing?  No   Do you need help to go to places out of walking distance? No   Do you need help shopping? No   Do you need help preparing meals?  No   Do you need help with housework?  No   Do you need help with laundry? No   Do you need help taking your medications? No   Do you need help managing money? No   Do you ever drive or ride in a car without wearing a seat belt? No   Have you felt unusual stress, anger or loneliness in the last month? Yes   Who do you live with? Other   If you need help, do you have trouble finding someone available to you? No   Do you have difficulty concentrating, remembering or making decisions? No       Age-appropriate Screening Schedule:  Refer to the list below for future screening recommendations based on patient's age, sex and/or medical conditions. Orders for these recommended tests are listed in the plan section. The patient has been provided with a written plan.    Health Maintenance   Topic Date Due    ZOSTER VACCINE (1 of 2) Never done    RSV Vaccine - Adults (1 - 1-dose 60+ series) Never done    ANNUAL WELLNESS VISIT  02/09/2023    COVID-19 Vaccine (4 - 2023-24 season) 09/01/2023    LIPID PANEL  02/10/2024    INFLUENZA VACCINE  08/01/2024    COLORECTAL CANCER SCREENING  06/20/2028    TDAP/TD VACCINES (2 - Td or Tdap) 08/08/2028    HEPATITIS C SCREENING  Completed    Pneumococcal Vaccine 65+  Completed          CMS Preventative Services Quick Reference  Risk Factors Identified During Encounter    None Identified    The above risks/problems have been discussed with the patient.  Pertinent information has been shared with the patient in the After Visit  "Summary.  An After Visit Summary and PPPS were made available to the patient.  Diagnoses and all orders for this visit:    1. Medicare annual wellness visit, subsequent (Primary)  Assessment & Plan:  Medicare wellness completed.  Discussed advanced directives with patient.  Performed the Mini-Mental exam and patient scored 29/30.        2. Screening for prostate cancer  -     PSA Screen    3. Essential hypertension  -     CBC & Differential  -     Comprehensive Metabolic Panel  -     Lipid Panel With / Chol / HDL Ratio  -     TSH      Follow Up:  Next Medicare Wellness visit to be scheduled in 1 year.        Additional E&M Note during same encounter follows:  Patient has multiple medical problems which are significant and separately identifiable that require additional work above and beyond the Medicare Wellness Visit.      Chief Complaint  Medicare Wellness-subsequent (AWV)    Subjective        HPI  Blaine Jimenez is also being seen today for   History of Present Illness      Review of Systems   All other systems reviewed and are negative.      Objective   Vital Signs:  /70   Pulse 72   Temp 97.7 °F (36.5 °C)   Resp 18   Ht 182.9 cm (72\")   Wt 82.6 kg (182 lb)   SpO2 98%   BMI 24.68 kg/m²     Physical Exam  Vitals reviewed.   Constitutional:       Appearance: He is well-developed.   HENT:      Head: Normocephalic and atraumatic.      Nose: Nose normal.   Eyes:      General: Lids are normal.      Conjunctiva/sclera: Conjunctivae normal.      Pupils: Pupils are equal, round, and reactive to light.   Cardiovascular:      Rate and Rhythm: Normal rate and regular rhythm.      Pulses: Normal pulses.      Heart sounds: Normal heart sounds.   Pulmonary:      Effort: Pulmonary effort is normal. No respiratory distress.      Breath sounds: Normal breath sounds.   Abdominal:      General: Bowel sounds are normal.      Palpations: Abdomen is soft.   Musculoskeletal:         General: Normal range of motion.      " Cervical back: Normal range of motion and neck supple.   Skin:     General: Skin is warm and dry.      Capillary Refill: Capillary refill takes less than 2 seconds.   Neurological:      Mental Status: He is alert and oriented to person, place, and time.   Psychiatric:         Behavior: Behavior normal.         Thought Content: Thought content normal.         Judgment: Judgment normal.      Physical Exam            Results               Assessment and Plan   Diagnoses and all orders for this visit:    1. Medicare annual wellness visit, subsequent (Primary)  Assessment & Plan:  Medicare wellness completed.  Discussed advanced directives with patient.  Performed the Mini-Mental exam and patient scored 29/30.        2. Screening for prostate cancer  -     PSA Screen    3. Essential hypertension  -     CBC & Differential  -     Comprehensive Metabolic Panel  -     Lipid Panel With / Chol / HDL Ratio  -     TSH      Assessment & Plan             Follow Up   No follow-ups on file.  Patient was given instructions and counseling regarding his condition or for health maintenance advice. Please see specific information pulled into the AVS if appropriate.

## 2024-05-14 LAB
ALBUMIN SERPL-MCNC: 4.6 G/DL (ref 3.5–5.2)
ALBUMIN/GLOB SERPL: 1.7 G/DL
ALP SERPL-CCNC: 131 U/L (ref 39–117)
ALT SERPL-CCNC: 18 U/L (ref 1–41)
AST SERPL-CCNC: 17 U/L (ref 1–40)
BASOPHILS # BLD AUTO: 0.07 10*3/MM3 (ref 0–0.2)
BASOPHILS NFR BLD AUTO: 1.1 % (ref 0–1.5)
BILIRUB SERPL-MCNC: 0.2 MG/DL (ref 0–1.2)
BUN SERPL-MCNC: 16 MG/DL (ref 8–23)
BUN/CREAT SERPL: 17.2 (ref 7–25)
CALCIUM SERPL-MCNC: 9.2 MG/DL (ref 8.6–10.5)
CHLORIDE SERPL-SCNC: 105 MMOL/L (ref 98–107)
CHOLEST SERPL-MCNC: 150 MG/DL (ref 0–200)
CHOLEST/HDLC SERPL: 3.33 {RATIO}
CO2 SERPL-SCNC: 25.2 MMOL/L (ref 22–29)
CREAT SERPL-MCNC: 0.93 MG/DL (ref 0.76–1.27)
EGFRCR SERPLBLD CKD-EPI 2021: 88.9 ML/MIN/1.73
EOSINOPHIL # BLD AUTO: 0.26 10*3/MM3 (ref 0–0.4)
EOSINOPHIL NFR BLD AUTO: 3.9 % (ref 0.3–6.2)
ERYTHROCYTE [DISTWIDTH] IN BLOOD BY AUTOMATED COUNT: 13.4 % (ref 12.3–15.4)
GLOBULIN SER CALC-MCNC: 2.7 GM/DL
GLUCOSE SERPL-MCNC: 108 MG/DL (ref 65–99)
HCT VFR BLD AUTO: 48.3 % (ref 37.5–51)
HDLC SERPL-MCNC: 45 MG/DL (ref 40–60)
HGB BLD-MCNC: 14.9 G/DL (ref 13–17.7)
IMM GRANULOCYTES # BLD AUTO: 0.02 10*3/MM3 (ref 0–0.05)
IMM GRANULOCYTES NFR BLD AUTO: 0.3 % (ref 0–0.5)
LDLC SERPL CALC-MCNC: 87 MG/DL (ref 0–100)
LYMPHOCYTES # BLD AUTO: 1.27 10*3/MM3 (ref 0.7–3.1)
LYMPHOCYTES NFR BLD AUTO: 19.1 % (ref 19.6–45.3)
MCH RBC QN AUTO: 30.3 PG (ref 26.6–33)
MCHC RBC AUTO-ENTMCNC: 30.8 G/DL (ref 31.5–35.7)
MCV RBC AUTO: 98.4 FL (ref 79–97)
MONOCYTES # BLD AUTO: 0.99 10*3/MM3 (ref 0.1–0.9)
MONOCYTES NFR BLD AUTO: 14.9 % (ref 5–12)
NEUTROPHILS # BLD AUTO: 4.04 10*3/MM3 (ref 1.7–7)
NEUTROPHILS NFR BLD AUTO: 60.7 % (ref 42.7–76)
NRBC BLD AUTO-RTO: 0 /100 WBC (ref 0–0.2)
PLATELET # BLD AUTO: 287 10*3/MM3 (ref 140–450)
POTASSIUM SERPL-SCNC: 4.7 MMOL/L (ref 3.5–5.2)
PROT SERPL-MCNC: 7.3 G/DL (ref 6–8.5)
PSA SERPL-MCNC: 0.71 NG/ML (ref 0–4)
RBC # BLD AUTO: 4.91 10*6/MM3 (ref 4.14–5.8)
SODIUM SERPL-SCNC: 141 MMOL/L (ref 136–145)
TRIGL SERPL-MCNC: 94 MG/DL (ref 0–150)
TSH SERPL DL<=0.005 MIU/L-ACNC: 2.04 UIU/ML (ref 0.27–4.2)
VLDLC SERPL CALC-MCNC: 18 MG/DL (ref 5–40)
WBC # BLD AUTO: 6.65 10*3/MM3 (ref 3.4–10.8)

## 2024-05-23 RX ORDER — CLOPIDOGREL BISULFATE 75 MG/1
75 TABLET ORAL DAILY
Qty: 30 TABLET | Refills: 0 | Status: SHIPPED | OUTPATIENT
Start: 2024-05-23

## 2024-06-02 DIAGNOSIS — I10 ESSENTIAL HYPERTENSION: ICD-10-CM

## 2024-06-03 RX ORDER — ISOSORBIDE MONONITRATE 30 MG/1
30 TABLET, EXTENDED RELEASE ORAL DAILY
Qty: 30 TABLET | Refills: 0 | Status: SHIPPED | OUTPATIENT
Start: 2024-06-03

## 2024-06-04 RX ORDER — CLOPIDOGREL BISULFATE 75 MG/1
75 TABLET ORAL DAILY
Qty: 90 TABLET | Refills: 1 | Status: SHIPPED | OUTPATIENT
Start: 2024-06-04

## 2024-06-04 NOTE — TELEPHONE ENCOUNTER
Rx Refill Note  Requested Prescriptions     Pending Prescriptions Disp Refills    clopidogrel (PLAVIX) 75 MG tablet 90 tablet 1     Sig: Take 1 tablet by mouth Daily.      Last office visit with prescribing clinician: 5/13/2024   Last telemedicine visit with prescribing clinician: Visit date not found   Next office visit with prescribing clinician: Visit date not found                         Would you like a call back once the refill request has been completed: [] Yes [] No    If the office needs to give you a call back, can they leave a voicemail: [] Yes [] No    Licha Foster MA  06/04/24, 09:23 EDT

## 2024-06-12 NOTE — PROGRESS NOTES
Subjective:     Encounter Date:06/13/2024      Patient ID: Blaine Jimenez is a 69 y.o. male.    Chief Complaint:  History of Present Illness    This is a 69-year-old with coronary artery disease, hypertension, hyperlipidemia, peripheral vascular disease with left carotid artery occlusion, who presents for follow up.       I saw the patient last in 8/2020 at which time he appeared to be doing well.  No changes were made to his management.  He was not seen again until 03/2023 when he was evaluated by AILYN Okeefe.  He was feeling well at the time.  Blood pressures are well-controlled.  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  No changes were made to his management.    He presents back today for annual follow-up.  He denies any worsening shortness of breath.  Denies any chest pain, palpitations, orthopnea, near-syncope or syncope.  He reports some occasional mild lower extremity swelling but this is unchanged.     Prior History:  His prior cardiac history includes the presentation with non-ST elevation MI and 10/2013.  At the time he presented with complaints of severe heartburn type symptoms.  On arrival he was noted to be severely hypertensive.  His troponins ended up being abnormal consistent with a non-ST elevation MI.  He underwent a cardiac catheterization on 10/9/2013 with Dr. Harper which showed normal left main, nonobstructive disease of his LAD, severe ostial disease of a small first diagonal branch, 90% ostial stenosis of a large ramus intermedius artery, moderate nonobstructive disease of the left circumflex artery, and severe distal stenosis of the right coronary artery with well-formed left to right collateral filling of the distal vessel.  His left ventricular angiogram showed akinesis of his basal to mid inferior wall.  Based on the presence of primarily branch and distal vessel disease and a chronic total occlusion with left-to-right collateral filling, medical  management was recommended at that time.      I saw the patient initially in 9/2018 when he presented for evaluation of an abnormal echocardiogram.  The patient had been seen for routine follow up by Dr. Singh at which time he was noted to have a murmur on exam.  He is set up for an echocardiogram for this reason which showed normal left ventricular systolic function with an EF of 56%, hypokinesis of his basal inferior, inferolateral, mid inferior, and mid inferolateral walls, normal diastolic function, and mild mitral regurgitation.  The patient denied any symptoms at the time.  His blood pressures were noted to be elevated.  Additionally he had a carotid ultrasound which showed moderate stenosis of his proximal right internal carotid artery and occlusion of a proximal left internal carotid artery.     When I saw the patient initially I did not recommend any further work since he appeared asymptomatic, had preserved left ventricular systolic function and his inferior wall motion abnormalities appeared to be chronic (present on prior left ventricular angiogram).  Since then I have primarily been working on blood pressure control.    Lisinopril had to be discontinued after he developed renal insufficiency and hyperkalemia.  He was then started on hydralazine and isosorbide mononitrate in its place.  Between myself and AILYN Estrella we have been working on titrating his medications..  This included increasing his hydralazine to 50 mg 3 times daily.  He does struggle with remembering his afternoon dose for a period of time but was able to correct this by setting an alarm.  Blood pressure has overall well-controlled and follow-up.    Review of Systems   Constitutional: Negative for malaise/fatigue.   HENT:  Negative for hearing loss, hoarse voice, nosebleeds and sore throat.    Eyes:  Negative for pain.   Cardiovascular:  Positive for dyspnea on exertion and leg swelling. Negative for chest pain, claudication,  cyanosis, irregular heartbeat, near-syncope, orthopnea, palpitations, paroxysmal nocturnal dyspnea and syncope.   Respiratory:  Negative for shortness of breath and snoring.    Endocrine: Negative for cold intolerance, heat intolerance, polydipsia, polyphagia and polyuria.   Skin:  Negative for itching and rash.   Musculoskeletal:  Negative for arthritis, falls, joint pain, joint swelling, muscle cramps, muscle weakness and myalgias.   Gastrointestinal:  Negative for constipation, diarrhea, dysphagia, heartburn, hematemesis, hematochezia, melena, nausea and vomiting.   Genitourinary:  Negative for frequency, hematuria and hesitancy.   Neurological:  Negative for excessive daytime sleepiness, dizziness, headaches, light-headedness, numbness and weakness.   Psychiatric/Behavioral:  Negative for depression. The patient is not nervous/anxious.          Current Outpatient Medications:     amLODIPine (NORVASC) 10 MG tablet, TAKE ONE TABLET BY MOUTH DAILY, Disp: 90 tablet, Rfl: 3    aspirin 81 MG chewable tablet, Chew., Disp: , Rfl:     atorvastatin (LIPITOR) 40 MG tablet, TAKE ONE TABLET BY MOUTH DAILY, Disp: 90 tablet, Rfl: 3    carvedilol (COREG) 12.5 MG tablet, TAKE ONE TABLET BY MOUTH TWICE A DAY, Disp: 180 tablet, Rfl: 3    clopidogrel (PLAVIX) 75 MG tablet, Take 1 tablet by mouth Daily., Disp: 90 tablet, Rfl: 1    hydrALAZINE (APRESOLINE) 50 MG tablet, TAKE ONE TABLET BY MOUTH THREE TIMES A DAY, Disp: 270 tablet, Rfl: 3    isosorbide mononitrate (IMDUR) 30 MG 24 hr tablet, TAKE 1 TABLET BY MOUTH DAILY, Disp: 30 tablet, Rfl: 0    Past Medical History:   Diagnosis Date    Anxiety 3 years ?    Atherosclerosis of native coronary artery of native heart without angina pectoris     Bruit of right carotid artery     Erectile dysfunction Soon after medication for blood pressure ect    Heart murmur     Hyperlipidemia     Hypertension     Myocardial infarction ?       Past Surgical History:   Procedure Laterality Date     "CARDIAC CATHETERIZATION  10/09/2013    [facility], Dr. David Harper       Family History   Problem Relation Age of Onset    Heart disease Father     Anxiety disorder Father     Heart disease Brother     Anxiety disorder Brother     Anxiety disorder Sister        Social History     Tobacco Use    Smoking status: Never    Smokeless tobacco: Never    Tobacco comments:     daily caffeine use   Vaping Use    Vaping status: Never Used   Substance Use Topics    Alcohol use: Yes     Alcohol/week: 6.0 standard drinks of alcohol     Types: 6 Cans of beer per week    Drug use: Never         ECG 12 Lead    Date/Time: 6/13/2024 3:37 PM  Performed by: Lizzy Wong MD    Authorized by: Lizzy Wong MD  Comparison: compared with previous ECG   Similar to previous ECG  Rhythm: sinus rhythm  Comments: Mild inferior repolarization abnormalities             Objective:     Visit Vitals  /80 (BP Location: Left arm, Patient Position: Sitting, Cuff Size: Adult)   Pulse 69   Ht 182.9 cm (72\")   Wt 79.3 kg (174 lb 12.8 oz)   SpO2 98%   BMI 23.71 kg/m²         Constitutional:       Appearance: Normal appearance. Well-developed.   HENT:      Head: Normocephalic and atraumatic.   Neck:      Vascular: No carotid bruit or JVD.   Pulmonary:      Effort: Pulmonary effort is normal.      Breath sounds: Normal breath sounds.   Cardiovascular:      Normal rate. Regular rhythm.      No gallop.    Pulses:     Radial: 2+ bilaterally.  Edema:     Peripheral edema absent.   Abdominal:      Palpations: Abdomen is soft.   Skin:     General: Skin is warm and dry.   Neurological:      Mental Status: Alert and oriented to person, place, and time.             Assessment:          Diagnosis Plan   1. Atherosclerosis of native coronary artery of native heart without angina pectoris        2. Bilateral carotid artery stenosis        3. Essential hypertension        4. Hypercholesterolemia               Plan:       1.  Coronary artery disease.  " Appears to be stable and asymptomatic.  His EKG shows inferior repolarization abnormalities which on review of his prior tracings has been intermittently present in the past.  Likely related to his known RCA chronic total occlusion.  He otherwise appears to be stable.  Recommend continue medical management.  2.  Hypertension.  Well-controlled on current regimen medications.  Continue the same.  3.  Hyperlipidemia.  On atorvastatin for goal LDL of around 70 or below.  His LDL recently has been running close to 90.  Discussed this with the patient.  If his LDL continues to trend up would consider uptitrating his atorvastatin dosage.  We discussed tightening up his diet some in the meanwhile.    Will plan on seeing the patient back again in 1 year or sooner if further issues arise.

## 2024-06-13 ENCOUNTER — OFFICE VISIT (OUTPATIENT)
Age: 69
End: 2024-06-13
Payer: MEDICARE

## 2024-06-13 VITALS
HEIGHT: 72 IN | DIASTOLIC BLOOD PRESSURE: 80 MMHG | HEART RATE: 69 BPM | SYSTOLIC BLOOD PRESSURE: 130 MMHG | BODY MASS INDEX: 23.68 KG/M2 | OXYGEN SATURATION: 98 % | WEIGHT: 174.8 LBS

## 2024-06-13 DIAGNOSIS — I10 ESSENTIAL HYPERTENSION: ICD-10-CM

## 2024-06-13 DIAGNOSIS — I65.23 BILATERAL CAROTID ARTERY STENOSIS: Chronic | ICD-10-CM

## 2024-06-13 DIAGNOSIS — I25.10 ATHEROSCLEROSIS OF NATIVE CORONARY ARTERY OF NATIVE HEART WITHOUT ANGINA PECTORIS: Primary | Chronic | ICD-10-CM

## 2024-06-13 DIAGNOSIS — E78.00 HYPERCHOLESTEROLEMIA: Chronic | ICD-10-CM

## 2024-06-13 PROCEDURE — 93000 ELECTROCARDIOGRAM COMPLETE: CPT | Performed by: INTERNAL MEDICINE

## 2024-06-13 PROCEDURE — 1160F RVW MEDS BY RX/DR IN RCRD: CPT | Performed by: INTERNAL MEDICINE

## 2024-06-13 PROCEDURE — 3079F DIAST BP 80-89 MM HG: CPT | Performed by: INTERNAL MEDICINE

## 2024-06-13 PROCEDURE — 99214 OFFICE O/P EST MOD 30 MIN: CPT | Performed by: INTERNAL MEDICINE

## 2024-06-13 PROCEDURE — 1159F MED LIST DOCD IN RCRD: CPT | Performed by: INTERNAL MEDICINE

## 2024-06-13 PROCEDURE — 3075F SYST BP GE 130 - 139MM HG: CPT | Performed by: INTERNAL MEDICINE

## 2024-06-13 NOTE — LETTER
June 13, 2024       No Recipients    Patient: Blaine Jimenez   YOB: 1955   Date of Visit: 6/13/2024       Dear Geo Smith Sr., MD    Blaine Jimenez was in my office today. Below is a copy of my note.    If you have questions, please do not hesitate to call me. I look forward to following Blaine along with you.         Sincerely,        Lizzy Wong MD        CC:   No Recipients        Subjective:     Encounter Date:06/13/2024      Patient ID: Blaine Jimenez is a 69 y.o. male.    Chief Complaint:  History of Present Illness    This is a 69-year-old with coronary artery disease, hypertension, hyperlipidemia, peripheral vascular disease with left carotid artery occlusion, who presents for follow up.       I saw the patient last in 8/2020 at which time he appeared to be doing well.  No changes were made to his management.  He was not seen again until 03/2023 when he was evaluated by AILYN Okeefe.  He was feeling well at the time.  Blood pressures are well-controlled.  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  No changes were made to his management.     Prior History:  His prior cardiac history includes the presentation with non-ST elevation MI and 10/2013.  At the time he presented with complaints of severe heartburn type symptoms.  On arrival he was noted to be severely hypertensive.  His troponins ended up being abnormal consistent with a non-ST elevation MI.  He underwent a cardiac catheterization on 10/9/2013 with Dr. Harper which showed normal left main, nonobstructive disease of his LAD, severe ostial disease of a small first diagonal branch, 90% ostial stenosis of a large ramus intermedius artery, moderate nonobstructive disease of the left circumflex artery, and severe distal stenosis of the right coronary artery with well-formed left to right collateral filling of the distal vessel.  His left ventricular angiogram showed akinesis of his basal to mid  inferior wall.  Based on the presence of primarily branch and distal vessel disease and a chronic total occlusion with left-to-right collateral filling, medical management was recommended at that time.      I saw the patient initially in 9/2018 when he presented for evaluation of an abnormal echocardiogram.  The patient had been seen for routine follow up by Dr. Singh at which time he was noted to have a murmur on exam.  He is set up for an echocardiogram for this reason which showed normal left ventricular systolic function with an EF of 56%, hypokinesis of his basal inferior, inferolateral, mid inferior, and mid inferolateral walls, normal diastolic function, and mild mitral regurgitation.  The patient denied any symptoms at the time.  His blood pressures were noted to be elevated.  Additionally he had a carotid ultrasound which showed moderate stenosis of his proximal right internal carotid artery and occlusion of a proximal left internal carotid artery.     When I saw the patient initially I did not recommend any further work since he appeared asymptomatic, had preserved left ventricular systolic function and his inferior wall motion abnormalities appeared to be chronic (present on prior left ventricular angiogram).  Since then I have primarily been working on blood pressure control.    Lisinopril had to be discontinued after he developed renal insufficiency and hyperkalemia.  He was then started on hydralazine and isosorbide mononitrate in its place.  Between myself and AILYN Estrella we have been working on titrating his medications..  This included increasing his hydralazine to 50 mg 3 times daily.  He does struggle with remembering his afternoon dose for a period of time but was able to correct this by setting an alarm.  Blood pressure has overall well-controlled and follow-up.    ROS      Current Outpatient Medications:   •  amLODIPine (NORVASC) 10 MG tablet, TAKE ONE TABLET BY MOUTH DAILY, Disp: 90  "tablet, Rfl: 3  •  aspirin 81 MG chewable tablet, Chew., Disp: , Rfl:   •  atorvastatin (LIPITOR) 40 MG tablet, TAKE ONE TABLET BY MOUTH DAILY, Disp: 90 tablet, Rfl: 3  •  carvedilol (COREG) 12.5 MG tablet, TAKE ONE TABLET BY MOUTH TWICE A DAY, Disp: 180 tablet, Rfl: 3  •  clopidogrel (PLAVIX) 75 MG tablet, Take 1 tablet by mouth Daily., Disp: 90 tablet, Rfl: 1  •  hydrALAZINE (APRESOLINE) 50 MG tablet, TAKE ONE TABLET BY MOUTH THREE TIMES A DAY, Disp: 270 tablet, Rfl: 3  •  isosorbide mononitrate (IMDUR) 30 MG 24 hr tablet, TAKE 1 TABLET BY MOUTH DAILY, Disp: 30 tablet, Rfl: 0    Past Medical History:   Diagnosis Date   • Anxiety 3 years ?   • Atherosclerosis of native coronary artery of native heart without angina pectoris    • Bruit of right carotid artery    • Erectile dysfunction Soon after medication for blood pressure ect   • Heart murmur    • Hyperlipidemia    • Hypertension    • Myocardial infarction ?       Past Surgical History:   Procedure Laterality Date   • CARDIAC CATHETERIZATION  10/09/2013    [facility], Dr. David Harper       Family History   Problem Relation Age of Onset   • Heart disease Father    • Anxiety disorder Father    • Heart disease Brother    • Anxiety disorder Brother    • Anxiety disorder Sister        Social History     Tobacco Use   • Smoking status: Never   • Smokeless tobacco: Never   • Tobacco comments:     daily caffeine use   Vaping Use   • Vaping status: Never Used   Substance Use Topics   • Alcohol use: Yes     Alcohol/week: 6.0 standard drinks of alcohol     Types: 6 Cans of beer per week   • Drug use: Never       Procedures       Objective:     Visit Vitals  /80 (BP Location: Left arm, Patient Position: Sitting, Cuff Size: Adult)   Pulse 69   Ht 182.9 cm (72\")   Wt 79.3 kg (174 lb 12.8 oz)   SpO2 98%   BMI 23.71 kg/m²         Physical Exam    Lab Review:       Assessment:          Diagnosis Plan   1. Atherosclerosis of native coronary artery of native heart " without angina pectoris        2. Bilateral carotid artery stenosis        3. Essential hypertension        4. Hypercholesterolemia               Plan:

## 2024-06-19 ENCOUNTER — TELEPHONE (OUTPATIENT)
Dept: FAMILY MEDICINE CLINIC | Facility: CLINIC | Age: 69
End: 2024-06-19

## 2024-06-19 NOTE — TELEPHONE ENCOUNTER
"    Caller: Blaine Jimenez \"Gabriele\"    Relationship: Self    Best call back number: [phone]     Caller requesting test results: LABS    What test was performed: LABS    When was the test performed: 5-13-24    Where was the test performed: IN OFFICE    Additional notes: PATIENT SEEN THE LAB RESULTS, AND THEY WERE KIND OF CONCERNING. PLEASE CALL TO DISCUSS WHAT NEEDS TO BE DONE FROM THIS POINT ?    THANK YOU            "

## 2024-06-19 NOTE — TELEPHONE ENCOUNTER
When calling and going over lab results . Pts monocytes and lymphocytes were flagged as well as when they were drawn last year. Pt wanted to know if there was any concern ?

## 2024-06-29 DIAGNOSIS — I10 ESSENTIAL HYPERTENSION: ICD-10-CM

## 2024-07-01 RX ORDER — ISOSORBIDE MONONITRATE 30 MG/1
30 TABLET, EXTENDED RELEASE ORAL DAILY
Qty: 90 TABLET | Refills: 2 | Status: SHIPPED | OUTPATIENT
Start: 2024-07-01

## 2025-01-18 DIAGNOSIS — E78.00 HYPERCHOLESTEROLEMIA: ICD-10-CM

## 2025-01-20 RX ORDER — ATORVASTATIN CALCIUM 40 MG/1
40 TABLET, FILM COATED ORAL DAILY
Qty: 90 TABLET | Refills: 1 | Status: SHIPPED | OUTPATIENT
Start: 2025-01-20

## 2025-02-26 DIAGNOSIS — I10 ESSENTIAL HYPERTENSION: ICD-10-CM

## 2025-02-26 NOTE — TELEPHONE ENCOUNTER
Rx Refill Note  Requested Prescriptions     Pending Prescriptions Disp Refills    clopidogrel (PLAVIX) 75 MG tablet [Pharmacy Med Name: CLOPIDOGREL 75 MG TABLET] 90 tablet 1     Sig: TAKE 1 TABLET BY MOUTH DAILY      Last office visit with prescribing clinician: 5/13/2024   Last telemedicine visit with prescribing clinician: Visit date not found   Next office visit with prescribing clinician: Visit date not found                         Would you like a call back once the refill request has been completed: [] Yes [] No    If the office needs to give you a call back, can they leave a voicemail: [] Yes [] No    Kenn Hester MA  02/26/25, 08:35 EST

## 2025-02-27 RX ORDER — CLOPIDOGREL BISULFATE 75 MG/1
75 TABLET ORAL DAILY
Qty: 90 TABLET | Refills: 1 | Status: SHIPPED | OUTPATIENT
Start: 2025-02-27

## 2025-02-27 RX ORDER — CLOPIDOGREL BISULFATE 75 MG/1
75 TABLET ORAL DAILY
Qty: 90 TABLET | Refills: 1 | OUTPATIENT
Start: 2025-02-27

## 2025-02-27 RX ORDER — HYDRALAZINE HYDROCHLORIDE 50 MG/1
50 TABLET, FILM COATED ORAL 3 TIMES DAILY
Qty: 270 TABLET | Refills: 1 | Status: SHIPPED | OUTPATIENT
Start: 2025-02-27

## 2025-03-06 ENCOUNTER — HOSPITAL ENCOUNTER (EMERGENCY)
Facility: HOSPITAL | Age: 70
Discharge: HOME | End: 2025-03-06
Attending: EMERGENCY MEDICINE
Payer: MEDICARE

## 2025-03-06 ENCOUNTER — APPOINTMENT (OUTPATIENT)
Dept: CT IMAGING | Facility: HOSPITAL | Age: 70
End: 2025-03-06
Payer: MEDICARE

## 2025-03-06 VITALS
RESPIRATION RATE: 18 BRPM | HEIGHT: 72 IN | OXYGEN SATURATION: 98 % | WEIGHT: 180 LBS | HEART RATE: 96 BPM | SYSTOLIC BLOOD PRESSURE: 201 MMHG | BODY MASS INDEX: 24.38 KG/M2 | DIASTOLIC BLOOD PRESSURE: 92 MMHG | TEMPERATURE: 97.2 F

## 2025-03-06 DIAGNOSIS — I10 HYPERTENSION, UNSPECIFIED TYPE: ICD-10-CM

## 2025-03-06 DIAGNOSIS — H92.01 ACUTE OTALGIA, RIGHT: Primary | ICD-10-CM

## 2025-03-06 DIAGNOSIS — H72.91 PERFORATION OF RIGHT TYMPANIC MEMBRANE: ICD-10-CM

## 2025-03-06 PROCEDURE — 70480 CT ORBIT/EAR/FOSSA W/O DYE: CPT

## 2025-03-06 PROCEDURE — 70450 CT HEAD/BRAIN W/O DYE: CPT

## 2025-03-06 PROCEDURE — 99284 EMERGENCY DEPT VISIT MOD MDM: CPT

## 2025-03-06 RX ORDER — OFLOXACIN 3 MG/ML
5 SOLUTION AURICULAR (OTIC) DAILY
Qty: 10 ML | Refills: 0 | Status: SHIPPED | OUTPATIENT
Start: 2025-03-06

## 2025-03-06 RX ORDER — LORAZEPAM 0.5 MG/1
0.5 TABLET ORAL ONCE
Status: COMPLETED | OUTPATIENT
Start: 2025-03-06 | End: 2025-03-06

## 2025-03-06 RX ADMIN — LORAZEPAM 0.5 MG: 0.5 TABLET ORAL at 06:46
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
## 2025-03-06 NOTE — DISCHARGE INSTRUCTIONS
Rest at home avoiding any particularly strenuous activity today.     Eat small, frequent meals and drink plenty of fluids. Take any medication prescribed as instructed including daily eardrop and right ear.  Keep the ear dry and limit getting water in your ear while showering or bathing.    Monitor for any signs of recurrent symptoms or worsening and see your primary doctor to discuss your ER visit while returning to the ER if any concerns as we discussed.  ______________________  Elevated Blood pressure:  Your blood pressure was elevated today. An elevated blood pressure alone without new symptoms is not immediately dangerous and is generally not treated in the emergency department (ED). Things like stress and pain can temporarily raise your blood pressure. However, chronically elevated blood pressure (hypertension) can put you at risk for serious conditions such as stroke, heart attack, or kidney failure. You can receive a diagnosis of 'hypertension' if your blood pressure is consistently elevated over multiple visits to the doctor. After evaluating you, we feel that you are safe to go home.    Steps to take at home:    -Take any medications as prescribed.  -Eat a healthy diet, avoiding high salt foods such as chips, pickles, and pizza.  -Exercise and try to maintain a healthy weight.  -Avoid tobacco and street drugs.  -Avoid over-the-counter decongestant medications unless recommended by a doctor.  -Monitor your blood pressure, ideally with an automatic blood pressure machine at home.  -Bring a list of your blood pressure values to your follow up appointment.  -Follow up with your primary care doctor within one to two weeks to monitor your blood pressure and general health.    Please speak to your doctor or come back to the ED for new symptoms, such as severe headache, dizziness, confusion, weakness in an arm or leg, trouble speaking, abdominal pain, chest pain, difficulty breathing, or other new or worsening  symptoms. Please review medication inserts for side effects and call the ED if you have any questions about the medications or care you received.

## 2025-04-01 DIAGNOSIS — I25.10 ATHEROSCLEROSIS OF NATIVE CORONARY ARTERY OF NATIVE HEART WITHOUT ANGINA PECTORIS: ICD-10-CM

## 2025-04-01 DIAGNOSIS — I10 ESSENTIAL HYPERTENSION: ICD-10-CM

## 2025-04-02 RX ORDER — CARVEDILOL 12.5 MG/1
12.5 TABLET ORAL 2 TIMES DAILY
Qty: 180 TABLET | Refills: 0 | Status: SHIPPED | OUTPATIENT
Start: 2025-04-02

## 2025-04-02 RX ORDER — AMLODIPINE BESYLATE 10 MG/1
10 TABLET ORAL DAILY
Qty: 90 TABLET | Refills: 0 | Status: SHIPPED | OUTPATIENT
Start: 2025-04-02

## 2025-04-02 NOTE — TELEPHONE ENCOUNTER
Rx Refill Note  Requested Prescriptions     Pending Prescriptions Disp Refills    carvedilol (COREG) 12.5 MG tablet [Pharmacy Med Name: CARVEDILOL 12.5 MG TABLET] 180 tablet 1     Sig: TAKE 1 TABLET BY MOUTH TWICE A DAY    amLODIPine (NORVASC) 10 MG tablet [Pharmacy Med Name: amLODIPine BESYLATE 10MG TAB] 90 tablet 1     Sig: TAKE 1 TABLET BY MOUTH DAILY      Last office visit with prescribing clinician: 5/13/2024   Last telemedicine visit with prescribing clinician: Visit date not found   Next office visit with prescribing clinician: Visit date not found                         Would you like a call back once the refill request has been completed: [] Yes [] No    If the office needs to give you a call back, can they leave a voicemail: [] Yes [] No    Иван Calvin Rep  04/02/25, 09:19 EDT

## 2025-04-02 NOTE — TELEPHONE ENCOUNTER
HUB  TO RELAY:  LVM with patient to callbackand schedule annualwellness exam. Due after May 13th 2025.

## 2025-05-14 ENCOUNTER — OFFICE VISIT (OUTPATIENT)
Dept: FAMILY MEDICINE CLINIC | Facility: CLINIC | Age: 70
End: 2025-05-14
Payer: MEDICARE

## 2025-05-14 VITALS
HEIGHT: 72 IN | DIASTOLIC BLOOD PRESSURE: 80 MMHG | RESPIRATION RATE: 17 BRPM | WEIGHT: 187 LBS | OXYGEN SATURATION: 96 % | SYSTOLIC BLOOD PRESSURE: 132 MMHG | BODY MASS INDEX: 25.33 KG/M2 | HEART RATE: 71 BPM

## 2025-05-14 DIAGNOSIS — Z00.00 MEDICARE ANNUAL WELLNESS VISIT, SUBSEQUENT: Primary | ICD-10-CM

## 2025-05-14 DIAGNOSIS — R73.9 ELEVATED BLOOD SUGAR: ICD-10-CM

## 2025-05-14 DIAGNOSIS — Z12.5 SCREENING FOR PROSTATE CANCER: ICD-10-CM

## 2025-05-14 DIAGNOSIS — I10 ESSENTIAL HYPERTENSION: ICD-10-CM

## 2025-05-14 DIAGNOSIS — R73.03 PREDIABETES: Chronic | ICD-10-CM

## 2025-05-14 NOTE — ASSESSMENT & PLAN NOTE
Medicare wellness completed.  Discussed advanced directives with patient.  Performed the Mini-Mental exam and patient scored 29 percent /30.

## 2025-05-14 NOTE — PROGRESS NOTES
Subjective   The ABCs of the Annual Wellness Visit  Medicare Wellness Visit      Blaine Jimenez is a 69 y.o. patient who presents for a Medicare Wellness Visit.    The following portions of the patient's history were reviewed and   updated as appropriate: allergies, current medications, past family history, past medical history, past social history, past surgical history, and problem list.    Compared to one year ago, the patient's physical   health is the same.  Compared to one year ago, the patient's mental   health is the same.    Recent Hospitalizations:  He was not admitted to the hospital during the last year.     Current Medical Providers:  Patient Care Team:  Geo Smith Sr., MD as PCP - General (Family Medicine)  Lizzy Wong MD as Consulting Physician (Cardiology)  Jaswinder Ruelas DO (Vascular Surgery)    Outpatient Medications Prior to Visit   Medication Sig Dispense Refill    amLODIPine (NORVASC) 10 MG tablet TAKE 1 TABLET BY MOUTH DAILY 90 tablet 0    aspirin 81 MG chewable tablet Chew.      atorvastatin (LIPITOR) 40 MG tablet TAKE 1 TABLET BY MOUTH DAILY 90 tablet 1    carvedilol (COREG) 12.5 MG tablet TAKE 1 TABLET BY MOUTH TWICE A  tablet 0    clopidogrel (PLAVIX) 75 MG tablet TAKE 1 TABLET BY MOUTH DAILY 90 tablet 1    hydrALAZINE (APRESOLINE) 50 MG tablet Take 1 tablet by mouth 3 (Three) Times a Day. 270 tablet 1    isosorbide mononitrate (IMDUR) 30 MG 24 hr tablet TAKE 1 TABLET BY MOUTH DAILY 90 tablet 2    ofloxacin (FLOXIN) 0.3 % otic solution Administer 5 drops to the right ear Daily. 10 mL 0     No facility-administered medications prior to visit.     No opioid medication identified on active medication list. I have reviewed chart for other potential  high risk medication/s and harmful drug interactions in the elderly.      Aspirin is on active medication list. Aspirin use is indicated based on review of current medical condition/s. Pros and cons of this therapy have  "been discussed today. Benefits of this medication outweigh potential harm.  Patient has been encouraged to continue taking this medication.  .      Patient Active Problem List   Diagnosis    Spasm of back muscles    Atherosclerosis of coronary artery    Generalized anxiety disorder    Hypercholesterolemia    Essential hypertension    Health care maintenance    Heart murmur    Bilateral carotid artery stenosis    Prediabetes    Medicare annual wellness visit, subsequent     Advance Care Planning Advance Directive is not on file.  ACP discussion was held with the patient during this visit. Patient does not have an advance directive, information provided.            Objective   Vitals:    05/14/25 1005   BP: 132/80   Pulse: 71   Resp: 17   SpO2: 96%   Weight: 84.8 kg (187 lb)   Height: 182.9 cm (72\")   PainSc: 0-No pain       Estimated body mass index is 25.36 kg/m² as calculated from the following:    Height as of this encounter: 182.9 cm (72\").    Weight as of this encounter: 84.8 kg (187 lb).    BMI is >= 25 and <30. (Overweight) The following options were offered after discussion;: exercise counseling/recommendations and nutrition counseling/recommendations           Does the patient have evidence of cognitive impairment? No          Mini-Mental State Examination (MMSE)        Instructions: Ask the questions in the order listed. Score one point for each correct response within each question or activity.      Maximum Score  Patient’s Score  Questions    5   4 “What is the year?  Season?  Date?  Day of the week?  Month?”    5  5  “Where are we now: State?  County?  Town/city?  Hospital?  Floor?”    3   3 The examiner names three unrelated objects clearly and slowly, then asks the patient to name all three of them. The patient’s response is used for scoring. The examiner repeats them until patient learns all of them, if possible. Number of trials: ___________    5  5  “I would like you to count backward from 100 by " sevens.” (93, 86, 79, 72, 65, …) Stop after five answers.   Alternative: “Spell WORLD backwards.” ([id number])    3  3  “Earlier I told you the names of three things. Can you tell me what those were?”    2  2  Show the patient two simple objects, such as a wristwatch and a pencil, and ask the patient to name them.    1  1  “Repeat the phrase: ‘No ifs, ands, or buts.’”    3  3  “Take the paper in your right hand, fold it in half, and put it on the floor.”   (The examiner gives the patient a piece of blank paper.)    1  1  “Please read this and do what it says.” (Written instruction is “Close your eyes.”)    1  1  “Make up and write a sentence about anything.” (This sentence must contain a noun and a verb.)    1    “Please copy this picture.” (The examiner gives the patient a blank piece of paper and asks him/her to draw the symbol below. All 10 angles must be present and two must intersect.)             30  29  TOTAL                                                                                            Health  Risk Assessment    Smoking Status:  Social History     Tobacco Use   Smoking Status Never   Smokeless Tobacco Never   Tobacco Comments    daily caffeine use     Alcohol Consumption:  Social History     Substance and Sexual Activity   Alcohol Use Yes    Alcohol/week: 12.0 standard drinks of alcohol    Types: 12 Cans of beer per week       Fall Risk Screen  STEADI Fall Risk Assessment was completed, and patient is at LOW risk for falls.Assessment completed on:2025    Depression Screening   Little interest or pleasure in doing things? Not at all   Feeling down, depressed, or hopeless? Not at all   PHQ-2 Total Score 0      Health Habits and Functional and Cognitive Screenin/14/2025     5:47 AM   Functional & Cognitive Status   Do you have difficulty preparing food and eating? No   Do you have difficulty bathing yourself, getting dressed or grooming yourself? No   Do you have difficulty using the  toilet? No   Do you have difficulty moving around from place to place? No   Do you have trouble with steps or getting out of a bed or a chair? No   Current Diet Well Balanced Diet   Dental Exam Not up to date   Eye Exam Not up to date   Exercise (times per week) Other   Current Exercises Include House Cleaning;Other;Walking;Yard Work   Do you need help using the phone?  No   Are you deaf or do you have serious difficulty hearing?  No   Do you need help to go to places out of walking distance? No   Do you need help shopping? No   Do you need help preparing meals?  No   Do you need help with housework?  No   Do you need help with laundry? No   Do you need help taking your medications? No   Do you need help managing money? No   Do you ever drive or ride in a car without wearing a seat belt? No   Have you felt unusual stress, anger or loneliness in the last month? Yes   Who do you live with? Other   If you need help, do you have trouble finding someone available to you? No   Have you been bothered in the last four weeks by sexual problems? Yes   Do you have difficulty concentrating, remembering or making decisions? No           Age-appropriate Screening Schedule:  Refer to the list below for future screening recommendations based on patient's age, sex and/or medical conditions. Orders for these recommended tests are listed in the plan section. The patient has been provided with a written plan.    Health Maintenance List  Health Maintenance   Topic Date Due    LIPID PANEL  05/13/2025    ZOSTER VACCINE (1 of 2) 11/10/2025 (Originally 5/25/2004)    COVID-19 Vaccine (4 - 2024-25 season) 11/14/2025 (Originally 9/1/2024)    INFLUENZA VACCINE  07/01/2025    ANNUAL WELLNESS VISIT  05/14/2026    COLORECTAL CANCER SCREENING  06/20/2028    TDAP/TD VACCINES (2 - Td or Tdap) 08/08/2028    HEPATITIS C SCREENING  Completed    Pneumococcal Vaccine 50+  Completed                                                                             "                                                                    CMS Preventative Services Quick Reference  Risk Factors Identified During Encounter  None Identified    The above risks/problems have been discussed with the patient.  Pertinent information has been shared with the patient in the After Visit Summary.  An After Visit Summary and PPPS were made available to the patient.    Follow Up:   Next Medicare Wellness visit to be scheduled in 1 year.         Additional E&M Note during same encounter follows:  Patient has additional, significant, and separately identifiable condition(s)/problem(s) that require work above and beyond the Medicare Wellness Visit     Chief Complaint  Medicare Wellness-subsequent (AWV)    Subjective   HPI      Review of Systems   All other systems reviewed and are negative.             Objective   Vital Signs:  /80   Pulse 71   Resp 17   Ht 182.9 cm (72\")   Wt 84.8 kg (187 lb)   SpO2 96%   BMI 25.36 kg/m²   Physical Exam  Vitals reviewed.   Constitutional:       General: He is not in acute distress.     Appearance: Normal appearance. He is well-developed. He is not ill-appearing.   HENT:      Head: Normocephalic and atraumatic.      Right Ear: Tympanic membrane, ear canal and external ear normal.      Left Ear: Tympanic membrane, ear canal and external ear normal.      Nose: Nose normal.      Mouth/Throat:      Mouth: Mucous membranes are moist.      Pharynx: Oropharynx is clear. No posterior oropharyngeal erythema.   Eyes:      General: Lids are normal.      Extraocular Movements: Extraocular movements intact.      Conjunctiva/sclera: Conjunctivae normal.      Pupils: Pupils are equal, round, and reactive to light.   Cardiovascular:      Rate and Rhythm: Normal rate and regular rhythm.      Pulses: Normal pulses.      Heart sounds: Normal heart sounds.   Pulmonary:      Effort: Pulmonary effort is normal. No respiratory distress.      Breath sounds: Normal breath sounds. " No stridor.   Abdominal:      General: Abdomen is flat. Bowel sounds are normal.      Palpations: Abdomen is soft.   Musculoskeletal:         General: Normal range of motion.      Cervical back: Normal range of motion and neck supple.   Skin:     General: Skin is warm and dry.      Capillary Refill: Capillary refill takes less than 2 seconds.   Neurological:      General: No focal deficit present.      Mental Status: He is alert and oriented to person, place, and time. Mental status is at baseline.      Cranial Nerves: No cranial nerve deficit.      Sensory: No sensory deficit.      Motor: No weakness.      Coordination: Coordination normal.      Gait: Gait normal.      Deep Tendon Reflexes: Reflexes normal.   Psychiatric:         Mood and Affect: Mood normal.         Behavior: Behavior normal.         Thought Content: Thought content normal.         Judgment: Judgment normal.         The following data was reviewed by: Geo Smith Sr, MD on 05/14/2025:           Assessment and Plan Additional age appropriate preventative wellness advice topics were discussed during today's preventative wellness exam(some topics already addressed during AWV portion of the note above):    Physical Activity: Advised cardiovascular activity 150 minutes per week as tolerated. (example brisk walk for 30 minutes, 5 days a week).   Nutrition: Discussed nutrition plan with patient. Information shared in after visit summary. Goal is for a well balanced diet to enhance overall health.   Healthy Weight: Discussed current and goal BMI with patient. Steps to attain this goal discussed. Information shared in after visit summary.     Medicare annual wellness visit, subsequent  Medicare wellness completed.  Discussed advanced directives with patient.  Performed the Mini-Mental exam and patient scored 29 percent /30.           Essential hypertension      Orders:    CBC & Differential    Comprehensive Metabolic Panel    Lipid Panel With / Chol / HDL  Ratio    TSH    Prediabetes         Elevated blood sugar    Orders:    Hemoglobin A1c    Screening for prostate cancer    Orders:    PSA Screen            Follow Up   No follow-ups on file.  Patient was given instructions and counseling regarding his condition or for health maintenance advice. Please see specific information pulled into the AVS if appropriate.

## 2025-05-15 LAB
ALBUMIN SERPL-MCNC: 4.2 G/DL (ref 3.5–5.2)
ALBUMIN/GLOB SERPL: 1.6 G/DL
ALP SERPL-CCNC: 99 U/L (ref 39–117)
ALT SERPL-CCNC: 19 U/L (ref 1–41)
AST SERPL-CCNC: 26 U/L (ref 1–40)
BASOPHILS # BLD AUTO: 0.05 10*3/MM3 (ref 0–0.2)
BASOPHILS NFR BLD AUTO: 0.8 % (ref 0–1.5)
BILIRUB SERPL-MCNC: 0.6 MG/DL (ref 0–1.2)
BUN SERPL-MCNC: 12 MG/DL (ref 8–23)
BUN/CREAT SERPL: 14.8 (ref 7–25)
CALCIUM SERPL-MCNC: 9.1 MG/DL (ref 8.6–10.5)
CHLORIDE SERPL-SCNC: 103 MMOL/L (ref 98–107)
CHOLEST SERPL-MCNC: 147 MG/DL (ref 0–200)
CHOLEST/HDLC SERPL: 3.68 {RATIO}
CO2 SERPL-SCNC: 26.1 MMOL/L (ref 22–29)
CREAT SERPL-MCNC: 0.81 MG/DL (ref 0.76–1.27)
EGFRCR SERPLBLD CKD-EPI 2021: 94.8 ML/MIN/1.73
EOSINOPHIL # BLD AUTO: 0.2 10*3/MM3 (ref 0–0.4)
EOSINOPHIL NFR BLD AUTO: 3 % (ref 0.3–6.2)
ERYTHROCYTE [DISTWIDTH] IN BLOOD BY AUTOMATED COUNT: 12.2 % (ref 12.3–15.4)
GLOBULIN SER CALC-MCNC: 2.6 GM/DL
GLUCOSE SERPL-MCNC: 93 MG/DL (ref 65–99)
HBA1C MFR BLD: 5.6 % (ref 4.8–5.6)
HCT VFR BLD AUTO: 41 % (ref 37.5–51)
HDLC SERPL-MCNC: 40 MG/DL (ref 40–60)
HGB BLD-MCNC: 13.7 G/DL (ref 13–17.7)
IMM GRANULOCYTES # BLD AUTO: 0.01 10*3/MM3 (ref 0–0.05)
IMM GRANULOCYTES NFR BLD AUTO: 0.2 % (ref 0–0.5)
LDLC SERPL CALC-MCNC: 87 MG/DL (ref 0–100)
LYMPHOCYTES # BLD AUTO: 0.96 10*3/MM3 (ref 0.7–3.1)
LYMPHOCYTES NFR BLD AUTO: 14.5 % (ref 19.6–45.3)
MCH RBC QN AUTO: 31.7 PG (ref 26.6–33)
MCHC RBC AUTO-ENTMCNC: 33.4 G/DL (ref 31.5–35.7)
MCV RBC AUTO: 94.9 FL (ref 79–97)
MONOCYTES # BLD AUTO: 0.79 10*3/MM3 (ref 0.1–0.9)
MONOCYTES NFR BLD AUTO: 11.9 % (ref 5–12)
NEUTROPHILS # BLD AUTO: 4.61 10*3/MM3 (ref 1.7–7)
NEUTROPHILS NFR BLD AUTO: 69.6 % (ref 42.7–76)
NRBC BLD AUTO-RTO: 0 /100 WBC (ref 0–0.2)
PLATELET # BLD AUTO: 277 10*3/MM3 (ref 140–450)
POTASSIUM SERPL-SCNC: 4.4 MMOL/L (ref 3.5–5.2)
PROT SERPL-MCNC: 6.8 G/DL (ref 6–8.5)
PSA SERPL-MCNC: 0.61 NG/ML (ref 0–4)
RBC # BLD AUTO: 4.32 10*6/MM3 (ref 4.14–5.8)
SODIUM SERPL-SCNC: 138 MMOL/L (ref 136–145)
TRIGL SERPL-MCNC: 108 MG/DL (ref 0–150)
TSH SERPL DL<=0.005 MIU/L-ACNC: 1.06 UIU/ML (ref 0.27–4.2)
VLDLC SERPL CALC-MCNC: 20 MG/DL (ref 5–40)
WBC # BLD AUTO: 6.62 10*3/MM3 (ref 3.4–10.8)

## 2025-06-11 DIAGNOSIS — I10 ESSENTIAL HYPERTENSION: ICD-10-CM

## 2025-06-12 RX ORDER — ISOSORBIDE MONONITRATE 30 MG/1
30 TABLET, EXTENDED RELEASE ORAL DAILY
Qty: 90 TABLET | Refills: 1 | Status: SHIPPED | OUTPATIENT
Start: 2025-06-12

## 2025-07-21 DIAGNOSIS — I10 ESSENTIAL HYPERTENSION: ICD-10-CM

## 2025-07-21 DIAGNOSIS — I25.10 ATHEROSCLEROSIS OF NATIVE CORONARY ARTERY OF NATIVE HEART WITHOUT ANGINA PECTORIS: ICD-10-CM

## 2025-07-21 NOTE — TELEPHONE ENCOUNTER
"Caller: Blaine Jimenez \"Gabriele\"    Relationship: Self    Best call back number:[phone]      Requested Prescriptions:   Requested Prescriptions     Pending Prescriptions Disp Refills    clopidogrel (PLAVIX) 75 MG tablet 90 tablet 1     Sig: Take 1 tablet by mouth Daily.    amLODIPine (NORVASC) 10 MG tablet 90 tablet 0     Sig: Take 1 tablet by mouth Daily.    carvedilol (COREG) 12.5 MG tablet 180 tablet 0     Sig: Take 1 tablet by mouth 2 (Two) Times a Day.        Pharmacy where request should be sent: [facility] [id number] [place] [address]  AT [place]  - [phone]  - [phone] FX     Last office visit with prescribing clinician: 5/14/2025   Last telemedicine visit with prescribing clinician: Visit date not found   Next office visit with prescribing clinician: 5/18/2026     Additional details provided by patient:     Does the patient have less than a 3 day supply:  [] Yes  [x] No    Would you like a call back once the refill request has been completed: [] Yes [x] No    If the office needs to give you a call back, can they leave a voicemail: [] Yes [x] No    Иван Cabrera   07/21/25 09:21 EDT         "

## 2025-07-22 RX ORDER — AMLODIPINE BESYLATE 10 MG/1
10 TABLET ORAL DAILY
Qty: 90 TABLET | Refills: 3 | Status: SHIPPED | OUTPATIENT
Start: 2025-07-22

## 2025-07-22 RX ORDER — CLOPIDOGREL BISULFATE 75 MG/1
75 TABLET ORAL DAILY
Qty: 90 TABLET | Refills: 3 | Status: SHIPPED | OUTPATIENT
Start: 2025-07-22

## 2025-07-22 RX ORDER — CARVEDILOL 12.5 MG/1
12.5 TABLET ORAL 2 TIMES DAILY
Qty: 180 TABLET | Refills: 3 | Status: SHIPPED | OUTPATIENT
Start: 2025-07-22

## 2025-07-22 NOTE — TELEPHONE ENCOUNTER
Rx Refill Note  Requested Prescriptions     Pending Prescriptions Disp Refills    clopidogrel (PLAVIX) 75 MG tablet 90 tablet 1     Sig: Take 1 tablet by mouth Daily.    amLODIPine (NORVASC) 10 MG tablet 90 tablet 0     Sig: Take 1 tablet by mouth Daily.    carvedilol (COREG) 12.5 MG tablet 180 tablet 0     Sig: Take 1 tablet by mouth 2 (Two) Times a Day.      Last office visit with prescribing clinician: 5/14/2025   Last telemedicine visit with prescribing clinician: Visit date not found   Next office visit with prescribing clinician: 5/18/2026                         Would you like a call back once the refill request has been completed: [] Yes [] No    If the office needs to give you a call back, can they leave a voicemail: [] Yes [] No    Licha Foster MA  07/22/25, 09:36 EDT

## 2025-08-01 DIAGNOSIS — E78.00 HYPERCHOLESTEROLEMIA: ICD-10-CM

## 2025-08-01 RX ORDER — ATORVASTATIN CALCIUM 40 MG/1
40 TABLET, FILM COATED ORAL DAILY
Qty: 90 TABLET | Refills: 1 | Status: SHIPPED | OUTPATIENT
Start: 2025-08-01